# Patient Record
Sex: FEMALE | Race: WHITE | NOT HISPANIC OR LATINO | Employment: FULL TIME
[De-identification: names, ages, dates, MRNs, and addresses within clinical notes are randomized per-mention and may not be internally consistent; named-entity substitution may affect disease eponyms.]

---

## 2017-08-26 ENCOUNTER — HEALTH MAINTENANCE LETTER (OUTPATIENT)
Age: 22
End: 2017-08-26

## 2024-01-18 ENCOUNTER — HOSPITAL ENCOUNTER (EMERGENCY)
Facility: CLINIC | Age: 29
Discharge: HOME | End: 2024-01-18
Attending: EMERGENCY MEDICINE | Admitting: EMERGENCY MEDICINE

## 2024-01-18 ENCOUNTER — APPOINTMENT (OUTPATIENT)
Dept: CT IMAGING | Facility: CLINIC | Age: 29
End: 2024-01-18
Attending: EMERGENCY MEDICINE

## 2024-01-18 VITALS
WEIGHT: 115 LBS | TEMPERATURE: 98.2 F | BODY MASS INDEX: 19.63 KG/M2 | DIASTOLIC BLOOD PRESSURE: 70 MMHG | OXYGEN SATURATION: 96 % | HEIGHT: 64 IN | SYSTOLIC BLOOD PRESSURE: 118 MMHG | RESPIRATION RATE: 18 BRPM | HEART RATE: 92 BPM

## 2024-01-18 DIAGNOSIS — Z78.9 ALCOHOL USE: ICD-10-CM

## 2024-01-18 DIAGNOSIS — T25.229A DEEP PARTIAL THICKNESS BURN OF FOOT: ICD-10-CM

## 2024-01-18 DIAGNOSIS — R40.4 TRANSIENT ALTERATION OF AWARENESS: ICD-10-CM

## 2024-01-18 LAB
ALBUMIN SERPL BCG-MCNC: 4.4 G/DL (ref 3.5–5.2)
ALP SERPL-CCNC: 52 U/L (ref 40–150)
ALT SERPL W P-5'-P-CCNC: 14 U/L (ref 0–50)
AMPHETAMINES UR QL SCN: ABNORMAL
ANION GAP SERPL CALCULATED.3IONS-SCNC: 16 MMOL/L (ref 7–15)
AST SERPL W P-5'-P-CCNC: 64 U/L (ref 0–45)
ATRIAL RATE - MUSE: 83 BPM
BARBITURATES UR QL SCN: ABNORMAL
BASOPHILS # BLD AUTO: 0.1 10E3/UL (ref 0–0.2)
BASOPHILS NFR BLD AUTO: 1 %
BENZODIAZ UR QL SCN: ABNORMAL
BILIRUB SERPL-MCNC: 1.1 MG/DL
BUN SERPL-MCNC: 4.9 MG/DL (ref 6–20)
BZE UR QL SCN: ABNORMAL
CALCIUM SERPL-MCNC: 8.9 MG/DL (ref 8.6–10)
CANNABINOIDS UR QL SCN: ABNORMAL
CHLORIDE SERPL-SCNC: 94 MMOL/L (ref 98–107)
CREAT SERPL-MCNC: 0.65 MG/DL (ref 0.51–0.95)
DEPRECATED HCO3 PLAS-SCNC: 24 MMOL/L (ref 22–29)
DIASTOLIC BLOOD PRESSURE - MUSE: NORMAL MMHG
EGFRCR SERPLBLD CKD-EPI 2021: >90 ML/MIN/1.73M2
EOSINOPHIL # BLD AUTO: 0 10E3/UL (ref 0–0.7)
EOSINOPHIL NFR BLD AUTO: 0 %
ERYTHROCYTE [DISTWIDTH] IN BLOOD BY AUTOMATED COUNT: 14.7 % (ref 10–15)
ETHANOL SERPL-MCNC: <0.01 G/DL
FENTANYL UR QL: ABNORMAL
GLUCOSE SERPL-MCNC: 137 MG/DL (ref 70–99)
HCG SERPL QL: NEGATIVE
HCT VFR BLD AUTO: 32.9 % (ref 35–47)
HGB BLD-MCNC: 11.9 G/DL (ref 11.7–15.7)
IMM GRANULOCYTES # BLD: 0 10E3/UL
IMM GRANULOCYTES NFR BLD: 1 %
INTERPRETATION ECG - MUSE: NORMAL
LYMPHOCYTES # BLD AUTO: 1.1 10E3/UL (ref 0.8–5.3)
LYMPHOCYTES NFR BLD AUTO: 14 %
MCH RBC QN AUTO: 38 PG (ref 26.5–33)
MCHC RBC AUTO-ENTMCNC: 36.2 G/DL (ref 31.5–36.5)
MCV RBC AUTO: 105 FL (ref 78–100)
MONOCYTES # BLD AUTO: 0.9 10E3/UL (ref 0–1.3)
MONOCYTES NFR BLD AUTO: 12 %
NEUTROPHILS # BLD AUTO: 5.6 10E3/UL (ref 1.6–8.3)
NEUTROPHILS NFR BLD AUTO: 72 %
NRBC # BLD AUTO: 0 10E3/UL
NRBC BLD AUTO-RTO: 0 /100
OPIATES UR QL SCN: ABNORMAL
P AXIS - MUSE: 71 DEGREES
PCP QUAL URINE (ROCHE): ABNORMAL
PLATELET # BLD AUTO: 199 10E3/UL (ref 150–450)
POTASSIUM SERPL-SCNC: 3.4 MMOL/L (ref 3.4–5.3)
PR INTERVAL - MUSE: 130 MS
PROT SERPL-MCNC: 7.1 G/DL (ref 6.4–8.3)
QRS DURATION - MUSE: 86 MS
QT - MUSE: 386 MS
QTC - MUSE: 453 MS
R AXIS - MUSE: 71 DEGREES
RBC # BLD AUTO: 3.13 10E6/UL (ref 3.8–5.2)
SODIUM SERPL-SCNC: 134 MMOL/L (ref 135–145)
SYSTOLIC BLOOD PRESSURE - MUSE: NORMAL MMHG
T AXIS - MUSE: 73 DEGREES
VENTRICULAR RATE- MUSE: 83 BPM
WBC # BLD AUTO: 7.8 10E3/UL (ref 4–11)

## 2024-01-18 PROCEDURE — 80307 DRUG TEST PRSMV CHEM ANLYZR: CPT | Performed by: EMERGENCY MEDICINE

## 2024-01-18 PROCEDURE — 93005 ELECTROCARDIOGRAM TRACING: CPT | Mod: RTG

## 2024-01-18 PROCEDURE — 16020 DRESS/DEBRID P-THICK BURN S: CPT

## 2024-01-18 PROCEDURE — 80053 COMPREHEN METABOLIC PANEL: CPT | Performed by: EMERGENCY MEDICINE

## 2024-01-18 PROCEDURE — 85025 COMPLETE CBC W/AUTO DIFF WBC: CPT | Performed by: EMERGENCY MEDICINE

## 2024-01-18 PROCEDURE — 36415 COLL VENOUS BLD VENIPUNCTURE: CPT | Performed by: EMERGENCY MEDICINE

## 2024-01-18 PROCEDURE — 96374 THER/PROPH/DIAG INJ IV PUSH: CPT

## 2024-01-18 PROCEDURE — 99285 EMERGENCY DEPT VISIT HI MDM: CPT | Mod: 25

## 2024-01-18 PROCEDURE — 70450 CT HEAD/BRAIN W/O DYE: CPT

## 2024-01-18 PROCEDURE — 82077 ASSAY SPEC XCP UR&BREATH IA: CPT | Performed by: EMERGENCY MEDICINE

## 2024-01-18 PROCEDURE — 250N000011 HC RX IP 250 OP 636: Performed by: EMERGENCY MEDICINE

## 2024-01-18 PROCEDURE — 84703 CHORIONIC GONADOTROPIN ASSAY: CPT | Performed by: EMERGENCY MEDICINE

## 2024-01-18 RX ORDER — LORAZEPAM 2 MG/ML
1 INJECTION INTRAMUSCULAR ONCE
Status: COMPLETED | OUTPATIENT
Start: 2024-01-18 | End: 2024-01-18

## 2024-01-18 RX ADMIN — LORAZEPAM 1 MG: 2 INJECTION INTRAMUSCULAR; INTRAVENOUS at 04:38

## 2024-01-18 ASSESSMENT — ACTIVITIES OF DAILY LIVING (ADL)
ADLS_ACUITY_SCORE: 35

## 2024-01-18 NOTE — ED PROVIDER NOTES
"  History   Chief Complaint:  Transient alteration of awareness    HPI   Lilibeth Da Silva is a 28 year old female who presents to the ED for evaluation of a reported transient alteration of awareness at home including possible seizure-like activity.  This caused the patient to fall to the floor and her left foot to rest up against the baseboard heaters causing a burn and blisters to the lateral aspect of the left foot.  Patient states that her boyfriend was with her at the time and he called EMS.  Patient reports that she vapes nicotine and THC as well as was drinking alcohol today approximately half liter.  She reports that she has been drinking almost daily and has not gone without alcohol over the last 2 to 3 days.  Currently she feels fatigued and achy all over but denies any significant headache, neck pain, chest pain, abdominal pain, shortness of breath.  She denies any numbness tingling or weakness.  She denies any other drug use.  She denies any other symptoms at this time.  She reports that earlier in the day she felt that everything was normal and denies any recent illness or other symptoms.      Independent Historian:   None - Patient Only    Review of External Notes:  None    Medications:    Buprenorphine HCl-Naloxone   Fluoxetine    Past Medical History:    Anxiety  Depression  Substance abuse     Physical Exam   Patient Vitals for the past 24 hrs:   BP Temp Temp src Pulse Resp SpO2 Height Weight   01/18/24 0538 -- -- -- 93 -- -- -- --   01/18/24 0500 115/71 -- -- 86 15 -- -- --   01/18/24 0430 103/68 -- -- (!) 121 29 96 % -- --   01/18/24 0400 -- -- -- 100 17 -- -- --   01/18/24 0345 134/74 -- -- -- -- -- -- --   01/18/24 0245 -- -- -- 110 20 -- -- --   01/18/24 0211 136/81 98.3  F (36.8  C) Oral 105 24 -- 1.626 m (5' 4\") 52.2 kg (115 lb)   01/18/24 0200 (!) 119/105 -- -- (!) 168 (!) 37 97 % -- --        Physical Exam  General: Nontoxic Resting comfortably  Head:  Scalp, face, and head appear normal, " atraumatic non tender to palpation   Eyes:  Pupils are equal, round, reactive to light EOMI, no nystagmus     Conjunctivae non-injected and sclerae white  ENT:    The external nose is normal    Pinnae are normal  Neck:  Normal range of motion    There is no rigidity noted    Trachea is in the midline  CV:  Tachycardic rate, regular rhythm     Normal S1/S2, no S3/S4    No murmur or rub. Radial pulses 2+ bilaterally.  Resp:  Lungs are clear and equal bilaterally  There is no tachypnea    No increased work of breathing    No rales, wheezing, or rhonchi  GI:  Abdomen is soft, no rigidity or guarding    No distension, or mass    No tenderness or rebound tenderness   MS:  Normal muscular tone    Symmetric motor strength    No lower extremity edema  Skin:  No rash or acute skin lesions noted  Neuro:  A&Ox3, GCS 15    CN II - XII intact    Speech clear, fluent, and normal    Strength 5/5 and symmetric in bilateral upper and lower extremities.    No pronator drift. No leg drift. SILT throughout.    No ankle clonus    FTN testing normal. No tremor.     No meningismus   Psych: Anxious affect. Appropriate interactions.     Emergency Department Course     ECG results from 01/18/24   EKG 12-lead, tracing only     Value    Systolic Blood Pressure     Diastolic Blood Pressure     Ventricular Rate 83    Atrial Rate 83    DC Interval 130    QRS Duration 86        QTc 453    P Axis 71    R AXIS 71    T Axis 73    Interpretation ECG      Sinus rhythm  Normal ECG  No previous ECGs available  Interpreted by Sivakumar Manzanares MD at 2:44 AM.          Imaging:  CT Head w/o Contrast   Final Result   IMPRESSION:   1.  Normal head CT.           Laboratory:  Labs Ordered and Resulted from Time of ED Arrival to Time of ED Departure   COMPREHENSIVE METABOLIC PANEL - Abnormal       Result Value    Sodium 134 (*)     Potassium 3.4      Carbon Dioxide (CO2) 24      Anion Gap 16 (*)     Urea Nitrogen 4.9 (*)     Creatinine 0.65      GFR Estimate  >90      Calcium 8.9      Chloride 94 (*)     Glucose 137 (*)     Alkaline Phosphatase 52      AST 64 (*)     ALT 14      Protein Total 7.1      Albumin 4.4      Bilirubin Total 1.1     CBC WITH PLATELETS AND DIFFERENTIAL - Abnormal    WBC Count 7.8      RBC Count 3.13 (*)     Hemoglobin 11.9      Hematocrit 32.9 (*)      (*)     MCH 38.0 (*)     MCHC 36.2      RDW 14.7      Platelet Count 199      % Neutrophils 72      % Lymphocytes 14      % Monocytes 12      % Eosinophils 0      % Basophils 1      % Immature Granulocytes 1      NRBCs per 100 WBC 0      Absolute Neutrophils 5.6      Absolute Lymphocytes 1.1      Absolute Monocytes 0.9      Absolute Eosinophils 0.0      Absolute Basophils 0.1      Absolute Immature Granulocytes 0.0      Absolute NRBCs 0.0     URINE DRUG SCREEN PANEL - Abnormal    Amphetamines Urine Screen Negative      Barbituates Urine Screen Negative      Benzodiazepine Urine Screen Negative      Cannabinoids Urine Screen Positive (*)     Cocaine Urine Screen Negative      Fentanyl Qual Urine Screen Negative      Opiates Urine Screen Negative      PCP Urine Screen Negative     HCG QUALITATIVE PREGNANCY - Normal    hCG Serum Qualitative Negative     ETHYL ALCOHOL LEVEL - Normal    Alcohol ethyl <0.01          [facility]    Burn Treatment    Date/Time: 1/18/2024 5:30 AM    Performed by: Sivakumar Manzanares MD  Authorized by: Sivakumar Manzanares MD    Risks, benefits and alternatives discussed.      PROCEDURE DETAILS     Total body burn percentage - partial/full:  1    Burn area 1 details:     Burn depth:  Partial thickness (2nd)    Affected area: Lateral left foot.    Debridement performed: yes      Debridement mechanism:  Scissors    Indications for debridement: devitalized blisters and intact blisters      Wound base:  [place]    Wound treatment:  Bacitracin    Dressing:  Petrolatum gauze and non-stick sterile dressing        PROCEDURE    Patient Tolerance:  Patient  "tolerated the procedure well with no immediate complications       Emergency Department Course & Assessments:    Interventions:  Medications   LORazepam (ATIVAN) injection 1 mg (1 mg Intravenous $Given 1/18/24 0433)        Assessments, Independent Interpretation, Consults/Discussion of Management/Tests:  ED Course as of 01/18/24 0550   Thu Jan 18, 2024   0223 Patient seen and evaluated    0418 I performed an independent interpretation of the patient's head CT.  No large-volume intracranial hemorrhage or midline shift.   0538 Patient reassessed. Burn care performed.        Social Determinants of Health affecting care:  Social Connections/Isolation - patient reports her boyfriend broke up with her for another woman causing her increased stress and anxiety and increased alcohol use posing hazards to her health.    Disposition:  The patient was discharged to home.     Impression & Plan      Medical Decision Making:  Lilibeth Da Silva is a 28 year old female who presents to the ED for evaluation after a reported episode of transient alteration of awareness.  Patient reports that she was told she had a \"seizure\" but the details of the event are unclear.  She apparently fell to the floor and her foot came in contact with the baseboard heater which caused a burn to the lateral aspect of her left foot.  There are 2 areas of deep partial-thickness burns with blisters over the left lateral foot.  These were debrided and treated with bacitracin ointment and a sterile nonstick dressing.  I recommended she apply bacitracin twice daily keep the wounds clean and dry and covered with a sterile dressing.  In regards to the episode differential diagnosis includes syncope versus seizure versus drug effect.  ED evaluation is thankfully reassuring.  CT scan of the head negative for any acute intracranial findings.  She has no focal neurologic deficits and is completely back to her neurologic baseline.  She admits to drinking alcohol " today and using a THC vape but denies other drug use.  hCG is negative.  CBC and CMP are largely unremarkable.  Mild elevations of AST consistent with alcohol use.  Alcohol level here in the emergency department is negative.  She is not demonstrating any other evidence to suggest persistent alcohol withdrawal syndrome.  No significant hypertension or tachycardia once her initial anxiety of being in the emergency department resolved.  She is not tremulous and is not demonstrating evidence of hallucinations.  I discussed with the patient that the exact etiology of the event cannot be explained today but there does not appear to be any serious life-threatening or serious underlying emergent medical condition.  I discussed with her that it is certainly possible she had a seizure and very close follow-up with her outpatient primary care physician is indicated for further workup and monitoring.  I discussed with her that she should not drive or operate dangerous machinery until cleared by her doctor.  She had no recurrent episodes of altered mental status while in the emergency department.  EKG negative for ischemia or dysrhythmia.  Patient is agreeable to plan of care.  Close return precautions were provided and she was discharged in stable and improved condition.      Diagnosis:    ICD-10-CM    1. Transient alteration of awareness  R40.4       2. Alcohol use  Z78.9       3. Deep partial thickness burn of foot  T25.229A            Discharge Medications:  New Prescriptions    No medications on file          1/18/2024   Sivakumar Manzanares MD   Scribe Disclosure:  Roberth JALLOHed, am serving as a scribe at 2:02 AM on 1/18/2024 to document services personally performed by Sivakumar Manzanares MD based on my observations and the provider's statements to me.     Sivakumar Manzanares MD  01/18/24 0551

## 2024-01-18 NOTE — DISCHARGE INSTRUCTIONS
It is possible you may have had a seizure. You should avoid heavy alcohol use or use of any other drugs or illicit substances. Because you may have had a seizure you should not drive a vehicle or operate any dangerous machinery. [place] state law requires that you report a seizure, and the date it occurred, to the Department of Vehicle Services within 30 days of the episode.

## 2024-01-18 NOTE — ED TRIAGE NOTES
BIBA for seizure like activity; pt's boyfriend reports hearing a noise upstairs, found pt on floor with seizure like activity. Reports blue lips, fingers. Activity lasting approx 45 sec -1 min. EMS, pt report pt's L foot up against radiator during seizure, dressed on scene with gauze.

## 2024-01-18 NOTE — ED NOTES
"Writer attempted to get information from patient as she stated she needed help arranging discharge transportation. When asked what address to use, pt states \"I don't have an apartment anymore. My boyfriend says he's kicking me out and throwing my stuff out. He doesn't want me going back to get my stuff and he'll try to get me arrested if I go back.\" Plan to allow patient more time to arrange a discharge address, pt agreeable. Continue to monitor, readdress.   "

## 2024-01-18 NOTE — ED NOTES
Bed: ED24  Expected date: 1/18/24  Expected time: 1:50 AM  Means of arrival: Ambulance  Comments:  AMAURI 427 27F seizure

## 2024-01-18 NOTE — ED NOTES
"Pt returned from bathroom with urine specimen. Was heard by staff in rose shouting \"get out!\" When staff entered room, pt was alone. Pt states that ex-boyfriend was just there (no observed visitors), then states she was alone. States she has \"voices in her head,\" that no body cares about her. Dr. Manzanares made aware of pt's fluctuating behaviors. One time dose ativan IV ordered. Pt agreeable and more oriented/calm during administration. Continue to monitor.   "

## 2024-01-18 NOTE — ED NOTES
Pt asked if she felt able to produce a urine sample, states she feels able. Specimen container provided, pt ambulated down rose towards bathroom. Collection instructions provided. Awaiting outcome, continue to monitor.

## 2024-01-19 ENCOUNTER — HOSPITAL ENCOUNTER (INPATIENT)
Facility: CLINIC | Age: 29
LOS: 3 days | Discharge: HOME | DRG: 897 | End: 2024-01-22
Attending: EMERGENCY MEDICINE | Admitting: STUDENT IN AN ORGANIZED HEALTH CARE EDUCATION/TRAINING PROGRAM
Payer: MEDICAID

## 2024-01-19 DIAGNOSIS — F10.229 ACUTE ALCOHOLIC INTOXICATION IN ALCOHOLISM WITH COMPLICATION (H): ICD-10-CM

## 2024-01-19 DIAGNOSIS — S90.852A: Primary | ICD-10-CM

## 2024-01-19 DIAGNOSIS — S91.302A UNSPECIFIED OPEN WOUND, LEFT FOOT, INITIAL ENCOUNTER: ICD-10-CM

## 2024-01-19 DIAGNOSIS — F19.90 SUBSTANCE USE DISORDER: ICD-10-CM

## 2024-01-19 DIAGNOSIS — F10.951: ICD-10-CM

## 2024-01-19 PROBLEM — F10.99 ALCOHOL-RELATED DISORDER (H): Status: ACTIVE | Noted: 2024-01-19

## 2024-01-19 LAB
ALBUMIN SERPL BCG-MCNC: 4.7 G/DL (ref 3.5–5.2)
ALP SERPL-CCNC: 52 U/L (ref 40–150)
ALT SERPL W P-5'-P-CCNC: 17 U/L (ref 0–50)
AMPHETAMINES UR QL SCN: ABNORMAL
ANION GAP SERPL CALCULATED.3IONS-SCNC: 15 MMOL/L (ref 7–15)
AST SERPL W P-5'-P-CCNC: 80 U/L (ref 0–45)
BARBITURATES UR QL SCN: ABNORMAL
BASOPHILS # BLD AUTO: 0.1 10E3/UL (ref 0–0.2)
BASOPHILS NFR BLD AUTO: 1 %
BENZODIAZ UR QL SCN: ABNORMAL
BILIRUB SERPL-MCNC: 0.7 MG/DL
BUN SERPL-MCNC: 7.1 MG/DL (ref 6–20)
BZE UR QL SCN: ABNORMAL
CALCIUM SERPL-MCNC: 9.7 MG/DL (ref 8.6–10)
CANNABINOIDS UR QL SCN: ABNORMAL
CHLORIDE SERPL-SCNC: 97 MMOL/L (ref 98–107)
CREAT SERPL-MCNC: 0.63 MG/DL (ref 0.51–0.95)
DEPRECATED HCO3 PLAS-SCNC: 26 MMOL/L (ref 22–29)
EGFRCR SERPLBLD CKD-EPI 2021: >90 ML/MIN/1.73M2
EOSINOPHIL # BLD AUTO: 0 10E3/UL (ref 0–0.7)
EOSINOPHIL NFR BLD AUTO: 0 %
ERYTHROCYTE [DISTWIDTH] IN BLOOD BY AUTOMATED COUNT: 14.9 % (ref 10–15)
ETHANOL SERPL-MCNC: 0.12 G/DL
FENTANYL UR QL: ABNORMAL
GLUCOSE SERPL-MCNC: 95 MG/DL (ref 70–99)
HCG SERPL QL: NEGATIVE
HCT VFR BLD AUTO: 36.1 % (ref 35–47)
HGB BLD-MCNC: 13.1 G/DL (ref 11.7–15.7)
IMM GRANULOCYTES # BLD: 0 10E3/UL
IMM GRANULOCYTES NFR BLD: 1 %
LYMPHOCYTES # BLD AUTO: 1 10E3/UL (ref 0.8–5.3)
LYMPHOCYTES NFR BLD AUTO: 13 %
MAGNESIUM SERPL-MCNC: 1.8 MG/DL (ref 1.7–2.3)
MCH RBC QN AUTO: 38.9 PG (ref 26.5–33)
MCHC RBC AUTO-ENTMCNC: 36.3 G/DL (ref 31.5–36.5)
MCV RBC AUTO: 107 FL (ref 78–100)
MONOCYTES # BLD AUTO: 1 10E3/UL (ref 0–1.3)
MONOCYTES NFR BLD AUTO: 12 %
NEUTROPHILS # BLD AUTO: 6 10E3/UL (ref 1.6–8.3)
NEUTROPHILS NFR BLD AUTO: 73 %
NRBC # BLD AUTO: 0 10E3/UL
NRBC BLD AUTO-RTO: 0 /100
OPIATES UR QL SCN: ABNORMAL
PCP QUAL URINE (ROCHE): ABNORMAL
PHOSPHATE SERPL-MCNC: 2.8 MG/DL (ref 2.5–4.5)
PLATELET # BLD AUTO: 243 10E3/UL (ref 150–450)
POTASSIUM SERPL-SCNC: 2.9 MMOL/L (ref 3.4–5.3)
PROT SERPL-MCNC: 7.8 G/DL (ref 6.4–8.3)
RBC # BLD AUTO: 3.37 10E6/UL (ref 3.8–5.2)
SODIUM SERPL-SCNC: 138 MMOL/L (ref 135–145)
VIT B12 SERPL-MCNC: 888 PG/ML (ref 232–1245)
WBC # BLD AUTO: 8.2 10E3/UL (ref 4–11)

## 2024-01-19 PROCEDURE — 250N000013 HC RX MED GY IP 250 OP 250 PS 637: Performed by: STUDENT IN AN ORGANIZED HEALTH CARE EDUCATION/TRAINING PROGRAM

## 2024-01-19 PROCEDURE — 80307 DRUG TEST PRSMV CHEM ANLYZR: CPT | Performed by: EMERGENCY MEDICINE

## 2024-01-19 PROCEDURE — 250N000011 HC RX IP 250 OP 636: Performed by: EMERGENCY MEDICINE

## 2024-01-19 PROCEDURE — 250N000013 HC RX MED GY IP 250 OP 250 PS 637: Performed by: EMERGENCY MEDICINE

## 2024-01-19 PROCEDURE — 99232 SBSQ HOSP IP/OBS MODERATE 35: CPT | Performed by: PSYCHIATRY & NEUROLOGY

## 2024-01-19 PROCEDURE — 82374 ASSAY BLOOD CARBON DIOXIDE: CPT | Performed by: EMERGENCY MEDICINE

## 2024-01-19 PROCEDURE — 85025 COMPLETE CBC W/AUTO DIFF WBC: CPT | Performed by: EMERGENCY MEDICINE

## 2024-01-19 PROCEDURE — HZ2ZZZZ DETOXIFICATION SERVICES FOR SUBSTANCE ABUSE TREATMENT: ICD-10-PCS | Performed by: STUDENT IN AN ORGANIZED HEALTH CARE EDUCATION/TRAINING PROGRAM

## 2024-01-19 PROCEDURE — 84100 ASSAY OF PHOSPHORUS: CPT | Performed by: EMERGENCY MEDICINE

## 2024-01-19 PROCEDURE — 99223 1ST HOSP IP/OBS HIGH 75: CPT | Performed by: STUDENT IN AN ORGANIZED HEALTH CARE EDUCATION/TRAINING PROGRAM

## 2024-01-19 PROCEDURE — 96375 TX/PRO/DX INJ NEW DRUG ADDON: CPT | Performed by: EMERGENCY MEDICINE

## 2024-01-19 PROCEDURE — 82077 ASSAY SPEC XCP UR&BREATH IA: CPT | Performed by: EMERGENCY MEDICINE

## 2024-01-19 PROCEDURE — 99285 EMERGENCY DEPT VISIT HI MDM: CPT | Mod: 25 | Performed by: EMERGENCY MEDICINE

## 2024-01-19 PROCEDURE — 120N000002 HC R&B MED SURG/OB UMMC

## 2024-01-19 PROCEDURE — 83735 ASSAY OF MAGNESIUM: CPT | Performed by: EMERGENCY MEDICINE

## 2024-01-19 PROCEDURE — 36415 COLL VENOUS BLD VENIPUNCTURE: CPT | Performed by: EMERGENCY MEDICINE

## 2024-01-19 PROCEDURE — 258N000003 HC RX IP 258 OP 636: Performed by: EMERGENCY MEDICINE

## 2024-01-19 PROCEDURE — 250N000009 HC RX 250: Performed by: EMERGENCY MEDICINE

## 2024-01-19 PROCEDURE — 84703 CHORIONIC GONADOTROPIN ASSAY: CPT | Performed by: EMERGENCY MEDICINE

## 2024-01-19 PROCEDURE — 96365 THER/PROPH/DIAG IV INF INIT: CPT | Performed by: EMERGENCY MEDICINE

## 2024-01-19 PROCEDURE — 82607 VITAMIN B-12: CPT | Performed by: STUDENT IN AN ORGANIZED HEALTH CARE EDUCATION/TRAINING PROGRAM

## 2024-01-19 PROCEDURE — 82247 BILIRUBIN TOTAL: CPT | Performed by: EMERGENCY MEDICINE

## 2024-01-19 PROCEDURE — 99285 EMERGENCY DEPT VISIT HI MDM: CPT | Performed by: EMERGENCY MEDICINE

## 2024-01-19 PROCEDURE — 96361 HYDRATE IV INFUSION ADD-ON: CPT | Performed by: EMERGENCY MEDICINE

## 2024-01-19 RX ORDER — DIAZEPAM 10 MG/2ML
5-10 INJECTION, SOLUTION INTRAMUSCULAR; INTRAVENOUS EVERY 30 MIN PRN
Status: DISCONTINUED | OUTPATIENT
Start: 2024-01-19 | End: 2024-01-19

## 2024-01-19 RX ORDER — LORAZEPAM 2 MG/ML
1 INJECTION INTRAMUSCULAR ONCE
Status: COMPLETED | OUTPATIENT
Start: 2024-01-19 | End: 2024-01-19

## 2024-01-19 RX ORDER — OLANZAPINE 5 MG/1
5-10 TABLET, ORALLY DISINTEGRATING ORAL EVERY 6 HOURS PRN
Status: DISCONTINUED | OUTPATIENT
Start: 2024-01-19 | End: 2024-01-19

## 2024-01-19 RX ORDER — DIAZEPAM 5 MG
10 TABLET ORAL EVERY 30 MIN PRN
Status: DISCONTINUED | OUTPATIENT
Start: 2024-01-19 | End: 2024-01-22

## 2024-01-19 RX ORDER — GABAPENTIN 300 MG/1
300 CAPSULE ORAL EVERY 8 HOURS
Qty: 6 CAPSULE | Refills: 0 | Status: DISCONTINUED | OUTPATIENT
Start: 2024-01-25 | End: 2024-01-21

## 2024-01-19 RX ORDER — FOLIC ACID 1 MG/1
1 TABLET ORAL DAILY
Status: DISCONTINUED | OUTPATIENT
Start: 2024-01-20 | End: 2024-01-19

## 2024-01-19 RX ORDER — GABAPENTIN 600 MG/1
1200 TABLET ORAL ONCE
Status: COMPLETED | OUTPATIENT
Start: 2024-01-19 | End: 2024-01-19

## 2024-01-19 RX ORDER — AMOXICILLIN 250 MG
1 CAPSULE ORAL 2 TIMES DAILY PRN
Status: DISCONTINUED | OUTPATIENT
Start: 2024-01-19 | End: 2024-01-22 | Stop reason: HOSPADM

## 2024-01-19 RX ORDER — OLANZAPINE 5 MG/1
5 TABLET, ORALLY DISINTEGRATING ORAL ONCE
Status: COMPLETED | OUTPATIENT
Start: 2024-01-19 | End: 2024-01-19

## 2024-01-19 RX ORDER — HALOPERIDOL 5 MG/ML
2.5-5 INJECTION INTRAMUSCULAR EVERY 6 HOURS PRN
Status: DISCONTINUED | OUTPATIENT
Start: 2024-01-19 | End: 2024-01-19

## 2024-01-19 RX ORDER — MULTIPLE VITAMINS W/ MINERALS TAB 9MG-400MCG
1 TAB ORAL DAILY
Status: DISCONTINUED | OUTPATIENT
Start: 2024-01-20 | End: 2024-01-22 | Stop reason: HOSPADM

## 2024-01-19 RX ORDER — CLONIDINE HYDROCHLORIDE 0.1 MG/1
0.1 TABLET ORAL EVERY 8 HOURS
Status: DISCONTINUED | OUTPATIENT
Start: 2024-01-19 | End: 2024-01-21

## 2024-01-19 RX ORDER — OLANZAPINE 5 MG/1
5-10 TABLET, ORALLY DISINTEGRATING ORAL EVERY 6 HOURS PRN
Status: DISCONTINUED | OUTPATIENT
Start: 2024-01-19 | End: 2024-01-21

## 2024-01-19 RX ORDER — FLUMAZENIL 0.1 MG/ML
0.2 INJECTION, SOLUTION INTRAVENOUS
Status: DISCONTINUED | OUTPATIENT
Start: 2024-01-19 | End: 2024-01-22 | Stop reason: HOSPADM

## 2024-01-19 RX ORDER — LIDOCAINE 40 MG/G
CREAM TOPICAL
Status: DISCONTINUED | OUTPATIENT
Start: 2024-01-19 | End: 2024-01-22 | Stop reason: HOSPADM

## 2024-01-19 RX ORDER — POTASSIUM CHLORIDE 750 MG/1
40 TABLET, EXTENDED RELEASE ORAL
Status: COMPLETED | OUTPATIENT
Start: 2024-01-19 | End: 2024-01-19

## 2024-01-19 RX ORDER — DIAZEPAM 10 MG/2ML
5-10 INJECTION, SOLUTION INTRAMUSCULAR; INTRAVENOUS EVERY 30 MIN PRN
Status: DISCONTINUED | OUTPATIENT
Start: 2024-01-19 | End: 2024-01-22

## 2024-01-19 RX ORDER — QUETIAPINE FUMARATE 50 MG/1
50 TABLET, FILM COATED ORAL 2 TIMES DAILY
Status: DISCONTINUED | OUTPATIENT
Start: 2024-01-19 | End: 2024-01-21

## 2024-01-19 RX ORDER — MULTIPLE VITAMINS W/ MINERALS TAB 9MG-400MCG
1 TAB ORAL DAILY
Status: DISCONTINUED | OUTPATIENT
Start: 2024-01-20 | End: 2024-01-19

## 2024-01-19 RX ORDER — AMOXICILLIN 250 MG
2 CAPSULE ORAL 2 TIMES DAILY PRN
Status: DISCONTINUED | OUTPATIENT
Start: 2024-01-19 | End: 2024-01-22 | Stop reason: HOSPADM

## 2024-01-19 RX ORDER — BUPRENORPHINE AND NALOXONE 4; 1 MG/1; MG/1
1 FILM, SOLUBLE BUCCAL; SUBLINGUAL 3 TIMES DAILY
Status: DISCONTINUED | OUTPATIENT
Start: 2024-01-19 | End: 2024-01-21

## 2024-01-19 RX ORDER — GABAPENTIN 300 MG/1
900 CAPSULE ORAL EVERY 8 HOURS
Qty: 27 CAPSULE | Refills: 0 | Status: DISCONTINUED | OUTPATIENT
Start: 2024-01-20 | End: 2024-01-21

## 2024-01-19 RX ORDER — CETIRIZINE HYDROCHLORIDE 10 MG/1
10 TABLET ORAL ONCE
Status: COMPLETED | OUTPATIENT
Start: 2024-01-19 | End: 2024-01-19

## 2024-01-19 RX ORDER — FOLIC ACID 1 MG/1
1 TABLET ORAL DAILY
Status: DISCONTINUED | OUTPATIENT
Start: 2024-01-20 | End: 2024-01-22 | Stop reason: HOSPADM

## 2024-01-19 RX ORDER — CALCIUM CARBONATE 500 MG/1
1000 TABLET, CHEWABLE ORAL 4 TIMES DAILY PRN
Status: DISCONTINUED | OUTPATIENT
Start: 2024-01-19 | End: 2024-01-22 | Stop reason: HOSPADM

## 2024-01-19 RX ORDER — FLUMAZENIL 0.1 MG/ML
0.2 INJECTION, SOLUTION INTRAVENOUS
Status: DISCONTINUED | OUTPATIENT
Start: 2024-01-19 | End: 2024-01-19

## 2024-01-19 RX ORDER — SODIUM CHLORIDE 9 MG/ML
INJECTION, SOLUTION INTRAVENOUS CONTINUOUS
Status: DISCONTINUED | OUTPATIENT
Start: 2024-01-19 | End: 2024-01-21

## 2024-01-19 RX ORDER — HALOPERIDOL 5 MG/ML
2.5-5 INJECTION INTRAMUSCULAR EVERY 6 HOURS PRN
Status: DISCONTINUED | OUTPATIENT
Start: 2024-01-19 | End: 2024-01-21

## 2024-01-19 RX ORDER — GABAPENTIN 300 MG/1
600 CAPSULE ORAL EVERY 8 HOURS
Qty: 12 CAPSULE | Refills: 0 | Status: DISCONTINUED | OUTPATIENT
Start: 2024-01-23 | End: 2024-01-21

## 2024-01-19 RX ORDER — DIAZEPAM 5 MG
10 TABLET ORAL EVERY 30 MIN PRN
Status: DISCONTINUED | OUTPATIENT
Start: 2024-01-19 | End: 2024-01-19

## 2024-01-19 RX ORDER — GABAPENTIN 100 MG/1
100 CAPSULE ORAL EVERY 8 HOURS
Qty: 9 CAPSULE | Refills: 0 | Status: DISCONTINUED | OUTPATIENT
Start: 2024-01-27 | End: 2024-01-21

## 2024-01-19 RX ADMIN — POTASSIUM CHLORIDE 40 MEQ: 750 TABLET, EXTENDED RELEASE ORAL at 11:38

## 2024-01-19 RX ADMIN — CLONIDINE HYDROCHLORIDE 0.1 MG: 0.1 TABLET ORAL at 23:57

## 2024-01-19 RX ADMIN — CETIRIZINE HYDROCHLORIDE 10 MG: 10 TABLET, FILM COATED ORAL at 13:47

## 2024-01-19 RX ADMIN — SODIUM CHLORIDE 500 ML: 9 INJECTION, SOLUTION INTRAVENOUS at 08:39

## 2024-01-19 RX ADMIN — QUETIAPINE FUMARATE 50 MG: 50 TABLET ORAL at 23:57

## 2024-01-19 RX ADMIN — FOLIC ACID: 5 INJECTION, SOLUTION INTRAMUSCULAR; INTRAVENOUS; SUBCUTANEOUS at 10:03

## 2024-01-19 RX ADMIN — OLANZAPINE 5 MG: 5 TABLET, ORALLY DISINTEGRATING ORAL at 09:27

## 2024-01-19 RX ADMIN — SODIUM CHLORIDE: 0.9 INJECTION, SOLUTION INTRAVENOUS at 08:39

## 2024-01-19 RX ADMIN — GABAPENTIN 1200 MG: 600 TABLET, FILM COATED ORAL at 23:56

## 2024-01-19 RX ADMIN — LORAZEPAM 1 MG: 2 INJECTION, SOLUTION INTRAMUSCULAR; INTRAVENOUS at 09:36

## 2024-01-19 RX ADMIN — DIAZEPAM 10 MG: 5 TABLET ORAL at 16:52

## 2024-01-19 RX ADMIN — QUETIAPINE FUMARATE 50 MG: 50 TABLET ORAL at 13:47

## 2024-01-19 RX ADMIN — DIAZEPAM 10 MG: 5 TABLET ORAL at 23:57

## 2024-01-19 RX ADMIN — POTASSIUM CHLORIDE 40 MEQ: 750 TABLET, EXTENDED RELEASE ORAL at 09:36

## 2024-01-19 ASSESSMENT — ACTIVITIES OF DAILY LIVING (ADL)
ADLS_ACUITY_SCORE: 35

## 2024-01-19 ASSESSMENT — LIFESTYLE VARIABLES
AGITATION: NORMAL ACTIVITY
NAUSEA AND VOMITING: NO NAUSEA AND NO VOMITING
TOTAL SCORE: 7
PAROXYSMAL SWEATS: NO SWEAT VISIBLE
TACTILE DISTURBANCES: MILD ITCHING, PINS AND NEEDLES, BURNING OR NUMBNESS
ORIENTATION AND CLOUDING OF SENSORIUM: CANNOT DO SERIAL ADDITIONS OR IS UNCERTAIN ABOUT DATE
VISUAL DISTURBANCES: MILD SENSITIVITY
AUDITORY DISTURBANCES: VERY MILD HARSHNESS OR ABILITY TO FRIGHTEN
TREMOR: NO TREMOR
HEADACHE, FULLNESS IN HEAD: NOT PRESENT
ANXIETY: MILDLY ANXIOUS

## 2024-01-19 NOTE — H&P
[facility]    History and Physical - Hospitalist Service, GOLD TEAM 7       Date of Admission:  1/19/2024    Assessment & Plan      Lilibeth Da Silva is a 28 year old female with a history of substance use disorder on suboxone, anxiety, depression, and alcohol use disorder, who is presenting today in acute psychosis.     # Acute Psychosis   # Alcohol Use Disorder, Concern for withdrawal and intoxication  Alcohol level elevated at 0.12 with recent history of seizure activity prior to arrival. Per chart review, has been drinking heavily. Has been having visual and auditory hallucinations. Agitated, expansive mood.   - Psychiatry consult, appreciate recs  - CBC, CMP, Tox screen ordered  - Replete K+  - CIWA protocol  - TSH, vitamin B12, folate levels ordered  - Gabapentin and clonidine ordered  - Folic acid and thiamine ordered  - Chem dep consult if patient amenable     #Hypokalemia: Likely in the setting of alcohol use and poor nutrition. Replete per protocol.   #Elevated AST: Could be elevated in the setting of injury to liver, cardiac muscle, skeletal muscle, kidney, and/or brain. Will continue to trend for now, as no other s/s of end organ damage.  #Macrocytic Anemia: Likely in the setting of alcohol use with associated folic acid and/or vitamin B12 deficiency. Folic acid and thiamine tests ordered   #SHIRLEY: Continue suboxone TID  #Mood Disorder: Previously on Fluoxetine 20 mg, but stopped taking. Will not resume in the inpatient setting for now.         Diet: Regular Diet Adult    DVT Prophylaxis: Low Risk/Ambulatory with no VTE prophylaxis indicated  Thapa Catheter: Not present  Lines: None     Cardiac Monitoring: None  Code Status:  FULL    Clinically Significant Risk Factors Present on Admission        # Hypokalemia: Lowest K = 2.9 mmol/L in last 2 days, will replace as needed                           Disposition Plan      Expected Discharge Date: 01/21/2024                   Disposition: Plan to transfer to the [facility].     Gwen Gonzales MD  Hospitalist Service, GOLD TEAM 7  [facility]  Securely message with eMeter (more info)  Text page via [employer] Paging/Directory   See signed in provider for up to date coverage information    ______________________________________________________________________    Chief Complaint   History obtained from the patient.     History of Present Illness   Lilibeth Da Silva is a 28 year old female with a history of substance use disorder on suboxone, anxiety, depression, and alcohol use disorder, who is presenting today with auditory and visual hallucinations. Unfortunately, history was unable to be obtained from the patient, as she was unable to participate in history and physical examination. Per chart review, patient was seen on 1/18 after having seizure-like activity and burning her foot on a baseboard. She was discharged and then seen again today for visual/auditory hallucinations.    Past Medical History    Past Medical History:   Diagnosis Date    Anxiety     Depression     Substance abuse (H)     Substance abuse (H)        Past Surgical History  - None per chart review    Prior to Admission Medications   Prior to Admission Medications   Prescriptions Last Dose Informant Patient Reported? Taking?   Buprenorphine HCl-Naloxone HCl (SUBOXONE) 4-1 MG film   No No   Sig: Place 1 Film under the tongue 3 times daily   Patient not taking: Reported on 1/18/2024   FLUoxetine (PROZAC) 10 MG capsule  Self No No   Sig: Take 3 caps in the morning   Patient taking differently: 20 mg Take 3 caps in the morning   FLUoxetine (PROZAC) 20 MG capsule   No No   Sig: Take 1 capsule (20 mg) by mouth daily      Facility-Administered Medications: None        Physical Exam   Vital Signs: Temp: 97.6  F (36.4  C) Temp src: Oral BP: 129/71 Pulse: 85   Resp: 18 SpO2: 94 % O2 Device: None (Room air)    Weight: 0 lbs 0  oz  General: WDWN F, agitated and restless, walking around room  HEENT: anicteric sclera, pinpoint pupils, EOMI  Heart: RRR, nl S1 and S2, no m/r/g  Lungs: CTAB, no r/w/r  Abdomen: NTND    Medical Decision Making         Data   Results for orders placed or performed during the hospital encounter of 01/19/24 (from the past 24 hour(s))   CBC with platelets differential    Narrative    The following orders were created for panel order CBC with platelets differential.  Procedure                               Abnormality         Status                     ---------                               -----------         ------                     CBC with platelets and d...[[id number]]  Abnormal            Final result                 Please view results for these tests on the individual orders.   Comprehensive metabolic panel   Result Value Ref Range    Sodium 138 135 - 145 mmol/L    Potassium 2.9 (L) 3.4 - 5.3 mmol/L    Carbon Dioxide (CO2) 26 22 - 29 mmol/L    Anion Gap 15 7 - 15 mmol/L    Urea Nitrogen 7.1 6.0 - 20.0 mg/dL    Creatinine 0.63 0.51 - 0.95 mg/dL    GFR Estimate >90 >60 mL/min/1.73m2    Calcium 9.7 8.6 - 10.0 mg/dL    Chloride 97 (L) 98 - 107 mmol/L    Glucose 95 70 - 99 mg/dL    Alkaline Phosphatase 52 40 - 150 U/L    AST 80 (H) 0 - 45 U/L    ALT 17 0 - 50 U/L    Protein Total 7.8 6.4 - 8.3 g/dL    Albumin 4.7 3.5 - 5.2 g/dL    Bilirubin Total 0.7 <=1.2 mg/dL   Magnesium   Result Value Ref Range    Magnesium 1.8 1.7 - 2.3 mg/dL   HCG qualitative Blood   Result Value Ref Range    hCG Serum Qualitative Negative Negative   Alcohol ethyl   Result Value Ref Range    Alcohol ethyl 0.12 (H) <=0.01 g/dL   CBC with platelets and differential   Result Value Ref Range    WBC Count 8.2 4.0 - 11.0 10e3/uL    RBC Count 3.37 (L) 3.80 - 5.20 10e6/uL    Hemoglobin 13.1 11.7 - 15.7 g/dL    Hematocrit 36.1 35.0 - 47.0 %     (H) 78 - 100 fL    MCH 38.9 (H) 26.5 - 33.0 pg    MCHC 36.3 31.5 - 36.5 g/dL    RDW 14.9 10.0 -  15.0 %    Platelet Count 243 150 - 450 10e3/uL    % Neutrophils 73 %    % Lymphocytes 13 %    % Monocytes 12 %    % Eosinophils 0 %    % Basophils 1 %    % Immature Granulocytes 1 %    NRBCs per 100 WBC 0 <1 /100    Absolute Neutrophils 6.0 1.6 - 8.3 10e3/uL    Absolute Lymphocytes 1.0 0.8 - 5.3 10e3/uL    Absolute Monocytes 1.0 0.0 - 1.3 10e3/uL    Absolute Eosinophils 0.0 0.0 - 0.7 10e3/uL    Absolute Basophils 0.1 0.0 - 0.2 10e3/uL    Absolute Immature Granulocytes 0.0 <=0.4 10e3/uL    Absolute NRBCs 0.0 10e3/uL   Phosphorus   Result Value Ref Range    Phosphorus 2.8 2.5 - 4.5 mg/dL   Urine Drug Screen    Narrative    The following orders were created for panel order Urine Drug Screen.  Procedure                               Abnormality         Status                     ---------                               -----------         ------                     Urine Drug Screen Panel[[id number]]      Abnormal            Final result                 Please view results for these tests on the individual orders.   Urine Drug Screen Panel   Result Value Ref Range    Amphetamines Urine Screen Negative Screen Negative    Barbituates Urine Screen Negative Screen Negative    Benzodiazepine Urine Screen Negative Screen Negative    Cannabinoids Urine Screen Positive (A) Screen Negative    Cocaine Urine Screen Negative Screen Negative    Fentanyl Qual Urine Screen Negative Screen Negative    Opiates Urine Screen Negative Screen Negative    PCP Urine Screen Negative Screen Negative

## 2024-01-19 NOTE — MEDICATION SCRIBE - ADMISSION MEDICATION HISTORY
Medication Scribe Admission Medication History    Admission medication history is complete. The information provided in this note is only as accurate as the sources available at the time of the update.    Information Source(s): Facility (U/NH/) medication list/MAR and CareEverywhere/SureScripts via in-person    Pertinent Information: Patient seen yesterday at [facility] ED 1/18/2024. Writer completed medication hx per discharge summary from this visit. Writer deleted Suboxone 4-1 MG film and Fluoxetine 10 MG Cap.    Changes made to PTA medication list:  Added: None  Deleted: Suboxone 4-1 MG film            Fluoxetine 10 MG Cap  Changed: None       Allergies reviewed with patient and updates made in EHR: no    Medication History Completed By: Nichole Faulkner 1/19/2024 12:42 PM    PTA Med List   Medication Sig Last Dose    FLUoxetine (PROZAC) 20 MG capsule Take 1 capsule (20 mg) by mouth daily Unknown

## 2024-01-19 NOTE — CONSULTS
"      Psychiatry Consultation; Follow up         Labs and imaging reviewed. K 2.9    Total time spent in chart review, patient interview and coordination of care; 35      Identification Ms. Suzie Da Silva is a 28-year-old white female who presents to our emergency department with hallucinations and disorganization.  She has a previous history of substance use which was treated with buprenorphine and it appears that her narcotic use is in remission however she says she has been drinking every day for 3 days and this morning had about 1/2 L of hard liquor she was also vague being nicotine and THC.  I am asked to evaluate her psychiatric status by Dr. Gonzales.       Interim history:    Prior to interviewing this patient I had an opportunity to review the electronic medical record including the initial diagnostic evaluation completed by Shawna SABA she notes that the patient was experiencing auditory and visual hallucinations with hyperactive restless unable to focus and responding inappropriately.  The initial ED note completed by Dr. Manzanares documents her recent substance abuse.    On my interview the patient initially assured me that she had not built the emergency room, she had very labile affect and mood, and answering orientation questions she reported that she was in a hotel and was never able to give me the name of the month.  She did have a waxing and waning mental status usually laughing and smiling but at times appearing to be asleep..  Most of her symptoms are consistent with delirium although she is somewhat grandiose and \"manic delirium\" cannot be ruled out, I did interview the patient on 2 occasions and on the second occasion she believed that her father was in the room and asked me to get out of his way she also was angry that everyone was talking at once but I was the only one asking questions.  Given her recent history of substance use it seems likely that her presentation is a combination of " "intoxication and withdrawal.  I recommend that she be admitted to medicine started on a withdrawal protocol and given antipsychotics as necessary.  I note that she has recently been started on a Valium protocol and olanzapine and low-dose Haldol are available as PRNs.    I had an opportunity to discuss this case with the treating physician as well as the nursing staff the patient will be admitted to medicine and if she does not clear please consider a reconsult by psychiatry to report determine whether there is some other underlying psychotic illness     buprenorphine HCl-naloxone HCl  1 Film Sublingual TID    FLUoxetine  20 mg Oral Daily    [START ON 1/20/2024] folic acid  1 mg Oral Daily    [START ON 1/20/2024] multivitamin w/minerals  1 tablet Oral Daily    QUEtiapine  50 mg Oral BID    sodium chloride (PF)  3 mL Intracatheter Q8H    [START ON 1/20/2024] thiamine  100 mg Oral Daily              MSE:     On my interview the patient was laying in a gurney and appearing restless. Patient was intermittently cooperative, mood and, affect were labile, speech was coherent but disorganized Associations loose. Thought process was disorganized. Content of thought includes hallucinations and delusions she did not report suicidal ideation. Patient alert and oriented x 1.  Recent and remote memory, concentration, fund of knowledge appeared impaired, and use of language were generally normal. Insight and judgement impaired.     Vital signs:  Temp: 98.2  F (36.8  C) Temp src: Oral BP: 126/84 Pulse: 81   Resp: 25 SpO2: 98 % O2 Device: None (Room air)        Estimated body mass index is 19.74 kg/m  as calculated from the following:    Height as of 1/18/24: 1.626 m (5' 4\").    Weight as of 1/18/24: 52.2 kg (115 lb).    Qtc: 453            Assessment:   Delirium likely secondary to alcohol withdrawal and perhaps other substances rule out underlying psychiatric disorder if her mental status does not clear          Plan:   Admit to " "internal medicine, treat with withdrawal protocol have as needed antipsychotics available as necessary, please check a fentanyl level.  I note that her potassium is already being appropriately replaced          Yan Guzman MD  \"Much or all of the text in this note was generated through the use of Dragon Dictate voice to text software. Errors in spelling or words which appear to be out of contact are unintentional, may be present due having escaped editing\"          "

## 2024-01-19 NOTE — ED TRIAGE NOTES
Pt BIBA from home  Yesterday was seen in ED for seizure - discharge 7pm  Auditory and visual hallucinations since 7pm last night  Denies self harm/SI  EMS reports patient was hallucinating and hyperactive en route.
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
## 2024-01-19 NOTE — CONSULTS
Diagnostic Evaluation Consultation  Crisis Assessment    Patient Name: Lilibeth Da Silva  Age:  28 year old  Legal Sex: female  Gender Identity: female  Pronouns:   Race: White  Ethnicity: Not  or   Language: English    Patient was assessed: Virtual: [facility] Crisis Assessment Start Time: 1056 Crisis Assessment Stop Time: 1106  Patient location: [facility] EMERGENCY DEPARTMENT       ED16    Referral Data and Chief Complaint  Lilibeth Da Silva presents to the ED via EMS. Patient is presenting to the ED for the following concerns: Significant behavioral change.   Factors that make the mental health crisis life threatening or complex are:  Pt brought in for concern for hallucinations.      Informed Consent and Assessment Methods  Explained the crisis assessment process, including applicable information disclosures and limits to confidentiality, assessed understanding of the process, and obtained consent to proceed with the assessment.  Assessment methods included conducting a formal interview with patient, review of medical records, collaboration with medical staff, and obtaining relevant collateral information from family and community providers when available: done     Patient response to interventions: no evidence of understanding  Coping skills were attempted to reduce the crisis:  None identified.     History of the Crisis   Pt brought via EMS from home for hallucinations. She was seen in the ED yesterday for a seizure and discharged, reportin auditory and visual hallucinations since 7pm last night. Denies SI per medics. Met with pt virtually, she is observed to be hyperactive and restless, cannot focus on  questions being asked. She either does not respond to responds inappropriately to all questions. She is observed almost constantly laughing, smiling and talking to herself.    Brief Psychosocial History  Family:  Single, Children no  Support System:  Significant Other, Parent(s),  Sibling(s)  Employment Status:   (unable to assess)  Source of Income:  unable to assess  Financial Environmental Concerns:   (unable to assess)  Current Hobbies:   (unable to assess)  Barriers in Personal Life:   (unable to assess)    Significant Clinical History  Current Anxiety Symptoms:  racing thoughts  Current Depression/Trauma:  difficulty concentrating  Current Somatic Symptoms:  racing thoughts  Current Psychosis/Thought Disturbance:  forgetful, inattentive, hyperactive, distractability, elated mood, auditory hallucinations, visual hallucinations  Current Eating Symptoms:     Chemical Use History:  Alcohol: Other (comments) (Per father, pt has been drinking hard liquor, unsure of more details.)  Benzodiazepines: None  Opiates: Other (comments) (Hx use, per chart has been on Suboxone, per father hx heroin use, clean now for 7-8 years.)  Cocaine: None  Marijuana:  (Unknown, but UDS positive for cannabis.)  Other Use: None  Withdrawal Symptoms: Hallucinations, Seizures   Past diagnosis:  Anxiety Disorder, Depression, Substance Use Disorder  Family history:  Substance Use Disorder  Past treatment:  Psychiatric Medication Management, Primary Care, Inpatient Hospitalization  Details of most recent treatment:  Per chart, pt hx with [facility] treatment, suboxone therapy, psychiatric medications and IP admission in adolescence.     Collateral Information  Is there collateral information: Yes     Collateral information name, relationship, phone number:  [phone] Franco Da Silva (father);  [phone] Jaqueline Petersen (mother)    What happened today: He was not aware.     What is different about patient's functioning: Reports that he had not talked to pt for about three weeks until last night after her brother told him that she had a seizure. She sounded ok to him. She talked to him briefly about a conflict getting 'blown out of proportion' with pt's boyfriend who she lives with. He knows that she has been using  alcohol, hard liquor, but unsure of details. Reports that she had a 'really bad drug problem' with heroin but has now been sober for 7-8 years. Reports that pt's mother has substance use issues ongoing.     Has patient made comments about wanting to kill themselves/others:  no    If d/c is recommended, can they take part in safety/aftercare planning:  yes       Risk Assessment  [place] Suicide Severity Rating Scale Full Clinical Version:  Suicidal Ideation  Q1 Wish to be Dead (Lifetime): No (per medics. Writer unable to assess.)  Q2 Non-Specific Active Suicidal Thoughts (Lifetime):  (unable to assess)     Suicidal Behavior (Lifetime)  Actual Attempt (Lifetime):  (unable to assess)  Has subject engaged in non-suicidal self-injurious behavior? (Lifetime):  (unable to assess)  Interrupted Attempts (Lifetime):  (unable to assess)  Aborted or Self-Interrupted Attempt (Lifetime):  (unable to assess)  Preparatory Acts or Behavior (Lifetime):  (unable to assess)    [place] Suicide Severity Rating Scale Recent:   Suicidal Ideation (Recent)  Q1 Wished to be Dead (Past Month):  (unable to assess)  Q2 Suicidal Thoughts (Past Month):  (unable to assess)  Intensity of Ideation (Recent)  Most Severe Ideation Rating (Past 1 Month):  (unable to assess)  Frequency (Past 1 Month):  (unable to assess)  Duration (Past 1 Month):  (unable to assess)  Controllability (Past 1 Month):  (unable to assess)  Deterrents (Past 1 Month):  (unable to assess)  Reasons for Ideation (Past 1 Month):  (unable to assess)  Suicidal Behavior (Recent)  Actual Attempt (Past 3 Months):  (unable to assess)  Has subject engaged in non-suicidal self-injurious behavior? (Past 3 Months):  (unable to assess)  Interrupted Attempts (Past 3 Months):  (unable to assess)  Aborted or Self-Interrupted Attempt (Past 3 Months):  (unable to assess)  Preparatory Acts or Behavior (Past 3 Months):  (unable to assess)    Environmental or Psychosocial Events: challenging  interpersonal relationships, ongoing abuse of substances  Protective Factors: Protective Factors: strong bond to family unit, community support, or employment, good treatment engagement, sense of importance of health and wellness, help seeking    Does the patient have thoughts of harming others? Feels Like Hurting Others:  (unable to assess)  Previous Attempt to Hurt Others:  (unable to assess)  Current presentation: Confused  Is the patient engaging in sexually inappropriate behavior?: no  Duty to warn initiated: no    Is the patient engaging in sexually inappropriate behavior?  no        Mental Status Exam   Affect: Dramatic  Appearance: Appropriate  Attention Span/Concentration: Inattentive  Eye Contact: Variable    Fund of Knowledge: Delayed   Language /Speech Content: Expressive Speech  Language /Speech Volume: Soft  Language /Speech Rate/Productions: Normal  Recent Memory: Poor  Remote Memory: Poor  Mood: Anxious, Euphoric  Orientation to Person: Yes   Orientation to Place:  (unable to assess due to nature of symtpoms)  Orientation to Time of Day:  (unable to assess due to nature of symtpoms)  Orientation to Date:  (unable to assess due to nature of symtpoms)     Situation (Do they understand why they are here?):  (unable to assess due to nature of symtpoms)  Psychomotor Behavior: Hyperactive  Thought Content: Hallucinations  Thought Form: Flight of Ideas       Medication  Psychotropic medications:   Medication Orders - Psychiatric (From admission, onward)      Start     Dose/Rate Route Frequency Ordered Stop    01/19/24 1140  FLUoxetine (PROzac) capsule 20 mg        Note to Pharmacy: PTA Sig:Take 1 capsule (20 mg) by mouth daily      20 mg Oral DAILY 01/19/24 1137      01/19/24 1120  QUEtiapine (SEROquel) tablet 50 mg         50 mg Oral 2 TIMES DAILY 01/19/24 1118      01/19/24 0924  diazepam (VALIUM) tablet 10 mg        See Hyperspace for full Linked Orders Report.    10 mg Oral EVERY 30 MIN PRN 01/19/24  0925 01/19/24 0924  diazepam (VALIUM) injection 5-10 mg        See Hyperspace for full Linked Orders Report.    5-10 mg  over 1-4 Minutes Intravenous EVERY 30 MIN PRN 01/19/24 0925      01/19/24 0923  OLANZapine zydis (zyPREXA) ODT tab 5-10 mg        See Hyperspace for full Linked Orders Report.    5-10 mg Oral EVERY 6 HOURS PRN 01/19/24 0925      01/19/24 0923  haloperidol lactate (HALDOL) injection 2.5-5 mg        See Hyperspace for full Linked Orders Report.    2.5-5 mg  over 1 Minutes Intravenous EVERY 6 HOURS PRN 01/19/24 0925               Current Care Team  Patient Care Team:  Yohannes Gipson MD (Inactive) as PCP - General (Family Practice)    Diagnosis  Patient Active Problem List   Diagnosis Code    Polysubstance abuse (H) F19.10    Tobacco dependence F17.200    IV drug abuse (H) F19.10    Contraception management Z30.9    High risk sexual behavior Z72.51    Depression with anxiety F41.8    ODD (oppositional defiant disorder) F91.3    Health Care Home Z76.89    Opiate withdrawal (H) F11.93    Acute alcoholic hallucinosis (H) F10.951    Acute alcoholic intoxication in alcoholism with complication (H) F10.229    Substance use disorder F19.90    Alcohol-related disorder (H24) F10.99       Primary Problem This Admission  Alcohol related disorder F10.99      Clinical Summary and Substantiation of Recommendations   Pt with history gathered primarily through chart as pt was not able to fully participate in assessment due to nature and severity of symptoms. Per chart, has remote hx of ODD with [facility] in adolescence, more recently seen related to depression, anxiety, polysubstance and opioid use. Today pt is disorganized and inattentive, exhibiting signs of psychosis with laughing and talking to people who are not there. Per ED MD, pt is being medically admitted for concerns for 'alcoholic hallucinosis.'    Patient coping skills attempted to reduce the crisis:  None identified.    Disposition  Recommended  disposition: Medical Admission        Reviewed case and recommendations with attending provider. Attending Name: Mario Valdez MD       Attending concurs with disposition: yes       Patient and/or validated legal guardian concurs with disposition:   yes       Final disposition:  medical    Assessment Details   Total duration spent with the patient: 10 min     CPT code(s) utilized: Non-Billable    Shawna Mario [facility], Psychotherapist  [facility] - Triage & Transition Services  Callback: [phone]

## 2024-01-20 LAB
ALBUMIN SERPL BCG-MCNC: 4.1 G/DL (ref 3.5–5.2)
ALP SERPL-CCNC: 48 U/L (ref 40–150)
ALT SERPL W P-5'-P-CCNC: 18 U/L (ref 0–50)
ANION GAP SERPL CALCULATED.3IONS-SCNC: 9 MMOL/L (ref 7–15)
AST SERPL W P-5'-P-CCNC: 50 U/L (ref 0–45)
BASOPHILS # BLD AUTO: 0.1 10E3/UL (ref 0–0.2)
BASOPHILS NFR BLD AUTO: 1 %
BILIRUB SERPL-MCNC: 0.6 MG/DL
BUN SERPL-MCNC: 5.8 MG/DL (ref 6–20)
CALCIUM SERPL-MCNC: 9 MG/DL (ref 8.6–10)
CHLORIDE SERPL-SCNC: 107 MMOL/L (ref 98–107)
CREAT SERPL-MCNC: 0.64 MG/DL (ref 0.51–0.95)
DEPRECATED HCO3 PLAS-SCNC: 28 MMOL/L (ref 22–29)
EGFRCR SERPLBLD CKD-EPI 2021: >90 ML/MIN/1.73M2
EOSINOPHIL # BLD AUTO: 0.1 10E3/UL (ref 0–0.7)
EOSINOPHIL NFR BLD AUTO: 2 %
ERYTHROCYTE [DISTWIDTH] IN BLOOD BY AUTOMATED COUNT: 15 % (ref 10–15)
FENTANYL UR QL: NORMAL
FOLATE SERPL-MCNC: 10.8 NG/ML (ref 4.6–34.8)
GLUCOSE SERPL-MCNC: 80 MG/DL (ref 70–99)
HCT VFR BLD AUTO: 36 % (ref 35–47)
HGB BLD-MCNC: 12.3 G/DL (ref 11.7–15.7)
IMM GRANULOCYTES # BLD: 0 10E3/UL
IMM GRANULOCYTES NFR BLD: 0 %
LYMPHOCYTES # BLD AUTO: 1.8 10E3/UL (ref 0.8–5.3)
LYMPHOCYTES NFR BLD AUTO: 25 %
MCH RBC QN AUTO: 38.1 PG (ref 26.5–33)
MCHC RBC AUTO-ENTMCNC: 34.2 G/DL (ref 31.5–36.5)
MCV RBC AUTO: 112 FL (ref 78–100)
MONOCYTES # BLD AUTO: 1 10E3/UL (ref 0–1.3)
MONOCYTES NFR BLD AUTO: 13 %
NEUTROPHILS # BLD AUTO: 4.4 10E3/UL (ref 1.6–8.3)
NEUTROPHILS NFR BLD AUTO: 59 %
NRBC # BLD AUTO: 0 10E3/UL
NRBC BLD AUTO-RTO: 0 /100
PLATELET # BLD AUTO: 235 10E3/UL (ref 150–450)
POTASSIUM SERPL-SCNC: 3.5 MMOL/L (ref 3.4–5.3)
POTASSIUM SERPL-SCNC: 3.5 MMOL/L (ref 3.4–5.3)
PROT SERPL-MCNC: 6.8 G/DL (ref 6.4–8.3)
RBC # BLD AUTO: 3.23 10E6/UL (ref 3.8–5.2)
SODIUM SERPL-SCNC: 144 MMOL/L (ref 135–145)
WBC # BLD AUTO: 7.3 10E3/UL (ref 4–11)

## 2024-01-20 PROCEDURE — 85004 AUTOMATED DIFF WBC COUNT: CPT | Performed by: STUDENT IN AN ORGANIZED HEALTH CARE EDUCATION/TRAINING PROGRAM

## 2024-01-20 PROCEDURE — 80053 COMPREHEN METABOLIC PANEL: CPT | Performed by: INTERNAL MEDICINE

## 2024-01-20 PROCEDURE — 80053 COMPREHEN METABOLIC PANEL: CPT | Performed by: STUDENT IN AN ORGANIZED HEALTH CARE EDUCATION/TRAINING PROGRAM

## 2024-01-20 PROCEDURE — 99418 PROLNG IP/OBS E/M EA 15 MIN: CPT | Performed by: NURSE PRACTITIONER

## 2024-01-20 PROCEDURE — 80307 DRUG TEST PRSMV CHEM ANLYZR: CPT

## 2024-01-20 PROCEDURE — 250N000013 HC RX MED GY IP 250 OP 250 PS 637: Performed by: INTERNAL MEDICINE

## 2024-01-20 PROCEDURE — 36415 COLL VENOUS BLD VENIPUNCTURE: CPT | Performed by: STUDENT IN AN ORGANIZED HEALTH CARE EDUCATION/TRAINING PROGRAM

## 2024-01-20 PROCEDURE — 120N000002 HC R&B MED SURG/OB UMMC

## 2024-01-20 PROCEDURE — 99232 SBSQ HOSP IP/OBS MODERATE 35: CPT | Performed by: INTERNAL MEDICINE

## 2024-01-20 PROCEDURE — 250N000013 HC RX MED GY IP 250 OP 250 PS 637: Performed by: STUDENT IN AN ORGANIZED HEALTH CARE EDUCATION/TRAINING PROGRAM

## 2024-01-20 PROCEDURE — 80349 CANNABINOIDS NATURAL: CPT | Performed by: INTERNAL MEDICINE

## 2024-01-20 PROCEDURE — 82746 ASSAY OF FOLIC ACID SERUM: CPT | Performed by: STUDENT IN AN ORGANIZED HEALTH CARE EDUCATION/TRAINING PROGRAM

## 2024-01-20 PROCEDURE — 99255 IP/OBS CONSLTJ NEW/EST HI 80: CPT | Performed by: NURSE PRACTITIONER

## 2024-01-20 PROCEDURE — 999N000157 HC STATISTIC RCP TIME EA 10 MIN

## 2024-01-20 RX ORDER — POTASSIUM CHLORIDE 750 MG/1
40 TABLET, EXTENDED RELEASE ORAL ONCE
Status: COMPLETED | OUTPATIENT
Start: 2024-01-20 | End: 2024-01-20

## 2024-01-20 RX ORDER — NALOXONE HYDROCHLORIDE 0.4 MG/ML
INJECTION, SOLUTION INTRAMUSCULAR; INTRAVENOUS; SUBCUTANEOUS
Status: DISCONTINUED
Start: 2024-01-20 | End: 2024-01-20 | Stop reason: WASHOUT

## 2024-01-20 RX ORDER — POTASSIUM CHLORIDE 750 MG/1
20 TABLET, EXTENDED RELEASE ORAL ONCE
Status: COMPLETED | OUTPATIENT
Start: 2024-01-20 | End: 2024-01-20

## 2024-01-20 RX ADMIN — Medication 5 MG: at 23:55

## 2024-01-20 RX ADMIN — DIAZEPAM 10 MG: 5 TABLET ORAL at 09:17

## 2024-01-20 RX ADMIN — THIAMINE HCL TAB 100 MG 100 MG: 100 TAB at 09:22

## 2024-01-20 RX ADMIN — Medication 1 TABLET: at 09:18

## 2024-01-20 RX ADMIN — FOLIC ACID 1 MG: 1 TABLET ORAL at 09:18

## 2024-01-20 RX ADMIN — OLANZAPINE 10 MG: 5 TABLET, ORALLY DISINTEGRATING ORAL at 01:26

## 2024-01-20 ASSESSMENT — ACTIVITIES OF DAILY LIVING (ADL)
ADLS_ACUITY_SCORE: 18
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
## 2024-01-20 NOTE — CODE/RAPID RESPONSE
Rapid Response Team Note    Assessment   In assessment a rapid response was called on Lilibeth Da Silva due to  unresponsiveness . This presentation is likely due to medication stacking.     On arrival to the room, patient was saturating 100% on room air, she appeared to be breathing comfortably, was not agonal nor apneic. Upon painful stimulation to her nailbed, she grimmaced, moaned, and withdraw from the pain. Her GCS was approximately 8, but at this moment she seemed to be protecting her airway. We hooked her up to EtCO2 monitoring which revealed an IPI of 10 and CO2 of 37-40. This does not appear to be a seizure given patients appropriate response to stimuli, no lose of bowel/bladder function, no tongue bite and intact pupils. Patient received multiple doses of valium per the [facility] protocols, most recently at about 0130, a dose of olanzapine around 2130, and her bedtime dose of seroquel around midnight. Patient is certainly on the cusp of needing intubation for airway protection, that said, this is likely secondary to medication stacking and she is currently protecting her airway. In discussion with the hospitalist, we decided that the patient likely will improve as the medications wear off and we will have close surveillance of her vitals and clinical presentation with a quick trigger to call another rapid and reconsider intubation if she were to deteriorate.      Plan   -  Started telemetry  -  Start EtCO2 monitoring  -  HOLD all mind altering medications  -  Frequent GCS checks and neuro exams  -  The Internal Medicine primary team was able to be reached and they are in agreement with the above plan.  -  Disposition: The patient will be transferred to [facility].  -  Reassessment and plan follow-up will be performed by the primary team  -  Please reach out to RRT team if GCS worsens or concern for airway protection worsens.       Dimitris Mccann, NP  [facility] RRT [facility] Job Code Contact #[phone]  [facility]  "Paging/Directory    Hospital Course   Brief Summary of events leading to rapid response:   Per chart review: \"Lilibeth Da Silva is a 28 year old female with a history of substance use disorder on suboxone, anxiety, depression, and alcohol use disorder, who is presenting today in acute psychosis.\"    Admission Diagnosis:   Acute alcoholic hallucinosis (H) [F10.951]  Acute alcoholic intoxication in alcoholism with complication (H) [F10.229]  Substance use disorder [F19.90]    Physical Exam   Temp: 98.2  F (36.8  C) Temp  Min: 97.6  F (36.4  C)  Max: 98.2  F (36.8  C)  Resp: 16 Resp  Min: 16  Max: 25  SpO2: 97 % SpO2  Min: 93 %  Max: 100 %  Pulse: 73 Pulse  Min: 73  Max: 105    No data recorded  BP: 106/68 Systolic (24hrs), Av , Min:106 , Max:148   Diastolic (24hrs), Av, Min:68, Max:90     I/Os: I/O last 3 completed shifts:  In: 240 [P.O.:240]  Out: -      Exam:   General: in no acute distress  Mental Status: altered level of consciousness based on her GCS of 8 .      Significant Results and Procedures   Lactic Acid: No results for input(s): \"LACT\", \"LACTS\" in the last 25289 hours.  CBC:   Recent Labs   Lab Test 24  0833 24  0204   WBC 8.2 7.8   HGB 13.1 11.9   HCT 36.1 32.9*    199        "

## 2024-01-20 NOTE — PROGRESS NOTES
MEDICINE CROSS COVER:  Responded to rapid response called at 0328 for patient being unresponsive.     Per chart review and nursing staff that patient is currently being treated for alcohol withdrawal. She is on CIWA scoring and has PRN valium available for withdrawal symptoms. She also has PRN anti-psychotics ordered for agitation, as well as scheduled gabapentin taper, scheduled clonidine, and PTA seroquel scheduled BID. Per RN and MAR, she received:  - Gabapentin 1,200 mg at 2356  - Clonidine 0.1 mg at 2357   - PO Valium 10 mg at 2357  - Seroquel 50 mg at 2357  - Zyprexa ODT 10 mg at 0126    Prior to receiving these medications, nursing staff report she was very awake and agitated, which is what prompted her to be given Zyprexa. After receiving all of these medications, she became somnolent and difficult to arouse, which is what prompted the RRT to be called.     Evaluated patient with MICU CARLOS at bedside. Patient had a pulse and was breathing spontaneously, but was sleeping and not aroused by voice or light touch. With nail bed pressure, she woke up briefly, opening her eyes and moving all of her extremities, as well as moaning, prior to going back to sleep. Vitals were stable (see below).    /79 (BP Location: Left arm, Patient Position: Semi-Torres's, Cuff Size: Adult Regular)   Pulse 73   Temp 98.2  F (36.8  C) (Oral)   Resp 16   SpO2 97%     Given that she was awake and active prior to receipt of the sedating medications listed above, and that she was able to be aroused with nail bed pressure, it is felt that her current somnolence is likely secondary to receiving multiple sedating medications in a short time frame. Per evaluation and discussion with MICU CARLOS, she appears to be protecting her airway at this time, and there is no indication for intubation unless she were to clinically decline.     Further discussed administration of both narcan and flumazenil as a way to reverse her current  somnolence. Decided against giving narcan as she has not received any opioids. Did not administer flumazenil due to report of possible seizure-like activity recently. Instead, will opt for closer monitoring while awaiting the medication effects to wear off.     PLAN:   - Upgrade status to [facility]  - Start cardiac monitoring  - Start end tidal CO2 monitoring  - Hold the following sedating medications: Suboxone - already held due to concern that patient has not been taking this, clonidine, gabapentin, Seroquel, Zyprexa, Haldol  - Will not hold PRN Valium given patient is admitted for alcohol withdrawal, but instructed nursing to not administer this medication until her mental status improves  - Suspect that her somnolence will resolve as the medication effects wear off; however, if she clinically decompensates will re-engage the ICU for further evaluation    Shanell Gilmore MD  Internal Medicine/Pediatrics Hospitalist   of Internal Medicine and Pediatrics  [facility]  Pager: [phone]

## 2024-01-20 NOTE — PROGRESS NOTES
[facility]    Medicine Progress Note - Hospitalist Service, GOLD TEAM 18    Date of Admission:  1/19/2024    Assessment & Plan   Lilibeth Da Silva is a 28 year old female with a history of substance use disorder on suboxone, anxiety, depression, and alcohol use disorder, who is presented and was admitted due to acute psychosis.      # Acute Psychosis   # Alcohol Use Disorder, Concern for withdrawal and intoxication  -- Alcohol level elevated on admission 0.12 with recent history of seizure activity prior to arrival.   -- Per chart review, has been drinking heavily.   -- She has also been having visual and auditory hallucinations. Agitated, expansive mood.   Psychiatry consult, appreciate recs; patient to remains on 72 HH  CIWA protocol with valium   - TSH, vitamin B12, folate levels ordered  Gabapentin and clonidine ordered  Folic acid and thiamine ordered     #Hypokalemia, resolved: Likely in the setting of alcohol use and poor nutrition. Replete per protocol.   #Elevated AST, improving: Could be elevated in the setting of injury to liver, cardiac muscle, skeletal muscle, kidney, and/or brain. Will continue to trend for now, as no other s/s of end organ damage.  #Macrocytic Anemia: Likely in the setting of alcohol use with associated folic acid and/or vitamin B12 deficiency. Folic acid and thiamine tests ordered   #SHIRLEY: Continue suboxone TID  #Mood Disorder: Previously on Fluoxetine 20 mg, but stopped taking. Will not resume in the inpatient setting for now.              Diet: Combination Diet Regular Diet Adult    DVT Prophylaxis: Pneumatic Compression Devices  Thapa Catheter: Not present  Lines: None     Cardiac Monitoring: ACTIVE order. Indication: Drug overdose (24 hours)  Code Status: Full Code      Clinically Significant Risk Factors        # Hypokalemia: Lowest K = 2.9 mmol/L in last 2 days, will replace as needed                             Disposition Plan  On  72 HH, ongoing detox and then transfer to inpatient psych.      Expected Discharge Date: 01/21/2024                    TIFFANY TRIMBLE MD  Hospitalist Service, GOLD TEAM 18  [facility]  Securely message with [facility] (more info)  Text page via [employer] Paging/Directory   See signed in provider for up to date coverage information  ______________________________________________________________________    Interval History   - afebrile and HDS  - patient asking to leave the hospital today    Physical Exam   Vital Signs: Temp: 97.6  F (36.4  C) Temp src: Oral BP: (!) 112/90 Pulse: 67   Resp: 16 SpO2: 98 % O2 Device: None (Room air)    Weight: 0 lbs 0 oz    General Appearance: Sitting comfortably in bed, on room air, in no acute distress of discomfort  HEENT: PERRL: EOMI; moist mucous membrane w/o lesions  Neck: No JVD  Pulmonary: Clear to auscultation bilaterally, no wheezes or crackles  CVS: Regular rhythm, no murmurs, rubs or gallops  GI: BS (+), soft nontender, no rebound or guarding   Extremities: No LE edema.   Skin: No rashes or lesions  Neurologic: A&O x3      Medical Decision Making       45 MINUTES SPENT BY ME on the date of service doing chart review, history, exam, documentation & further activities per the note.      Data   ------------------------- PAST 24 HR DATA REVIEWED -----------------------------------------------    I have personally reviewed the following data over the past 24 hrs:    7.3  \   12.3   / 235     144 107 5.8 (L) /  80   3.5; 3.5 28 0.64 \     ALT: 18 AST: 50 (H) AP: 48 TBILI: 0.6   ALB: 4.1 TOT PROTEIN: 6.8 LIPASE: N/A       Imaging results reviewed over the past 24 hrs:   No results found for this or any previous visit (from the past 24 hour(s)).

## 2024-01-20 NOTE — PROGRESS NOTES
Medicine Cross Cover Note      Pharmacy reached out that patient not taking suboxone as of 1/18 and has no fill history for this on  or sure scripts. Will hold tonight, day teal please reach out to addiction medicine in the AM for assistance with initiation. Will add fentanyl level as well per psych recs.     Sofiya Figueredo PA-C

## 2024-01-20 NOTE — PLAN OF CARE
Goal Outcome Evaluation    Vitals: within normal limit,( see flowsheet)    Neuro: lethargic and confused. At baseline patient is Alert, but as rom 0300, patient became lethargic      Activity: independent. But was unresponsive as from 03:00 am.     Other:  At around 1:20 am, patient was pulling monitors and trying to lift the bed up  stating that her siblings were under the bed.     At around 3 am staff tried to give medication to patient per CIWA scoring but patient was unresponsive.staff and other staff performed sternal kurt, but patient did not wake up. Flyer was called, Rapid response called, rapid response came in, doctors too came in, and patient responded to painful stimuli applied on nail bed. At this point doctor changed patient's status to IMC and telemetry (continuous cardiac monitoring) and capnograph.       At around 4: 40 am, patient woke up and started pulling the bed, staff redirected patient and patient laid back in bed.

## 2024-01-21 VITALS
SYSTOLIC BLOOD PRESSURE: 125 MMHG | DIASTOLIC BLOOD PRESSURE: 82 MMHG | TEMPERATURE: 98 F | RESPIRATION RATE: 20 BRPM | OXYGEN SATURATION: 96 % | HEART RATE: 77 BPM

## 2024-01-21 LAB
CREAT UR-MCNC: 49 MG/DL
POTASSIUM SERPL-SCNC: 3.9 MMOL/L (ref 3.4–5.3)

## 2024-01-21 PROCEDURE — 84132 ASSAY OF SERUM POTASSIUM: CPT | Performed by: INTERNAL MEDICINE

## 2024-01-21 PROCEDURE — 250N000013 HC RX MED GY IP 250 OP 250 PS 637: Performed by: STUDENT IN AN ORGANIZED HEALTH CARE EDUCATION/TRAINING PROGRAM

## 2024-01-21 PROCEDURE — 36415 COLL VENOUS BLD VENIPUNCTURE: CPT | Performed by: INTERNAL MEDICINE

## 2024-01-21 PROCEDURE — 99232 SBSQ HOSP IP/OBS MODERATE 35: CPT | Performed by: INTERNAL MEDICINE

## 2024-01-21 PROCEDURE — 120N000002 HC R&B MED SURG/OB UMMC

## 2024-01-21 RX ADMIN — Medication 1 TABLET: at 08:55

## 2024-01-21 RX ADMIN — THIAMINE HCL TAB 100 MG 100 MG: 100 TAB at 08:55

## 2024-01-21 RX ADMIN — FOLIC ACID 1 MG: 1 TABLET ORAL at 08:55

## 2024-01-21 ASSESSMENT — ACTIVITIES OF DAILY LIVING (ADL)
ADLS_ACUITY_SCORE: 18
ADLS_ACUITY_SCORE: 19
ADLS_ACUITY_SCORE: 18
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
## 2024-01-21 NOTE — PROGRESS NOTES
[facility]    Medicine Progress Note - Hospitalist Service, GOLD TEAM 18    Date of Admission:  1/19/2024    Assessment & Plan   Lilibeth Da Silva is a 28 year old female with a history of substance use disorder on suboxone, anxiety, depression, and alcohol use disorder, who is presented and was admitted due to acute psychosis.      # Acute Psychosis   # Alcohol Use Disorder, Concern for withdrawal and intoxication  # Recent history of withdrawal seizure  # Hallucinations, bizarre behavior  -- Alcohol level elevated on admission 0.12 with recent history of seizure activity prior to arrival.   -- Per chart review, has been drinking heavily.   -- She has also been having visual and auditory hallucinations. Agitated, expansive mood.   Psychiatry consult, appreciate recs; patient to remains on 72 HH  Since patient is scoring minimally on CIWA, will recommend transfer to inpatient psychiatry.  CIWA protocol with valium   - TSH, vitamin B12, folate levels ordered  Gabapentin and clonidine ordered  Folic acid and thiamine ordered  Seizure precautions     #Hypokalemia, resolved: Likely in the setting of alcohol use and poor nutrition. Replete per protocol.   #Elevated AST, improving: Could be elevated in the setting of injury to liver, cardiac muscle, skeletal muscle, kidney, and/or brain. Will continue to trend for now, as no other s/s of end organ damage.  #Macrocytic Anemia: Likely in the setting of alcohol use with associated folic acid and/or vitamin B12 deficiency. Folic acid and thiamine tests ordered   #SHIRLEY: Continue suboxone TID  #Mood Disorder: Previously on Fluoxetine 20 mg, but stopped taking. Will not resume in the inpatient setting for now.              Diet: Combination Diet Regular Diet Adult    DVT Prophylaxis: Pneumatic Compression Devices  Thapa Catheter: Not present  Lines: None     Cardiac Monitoring: None  Code Status: Full Code      Clinically Significant  Risk Factors                                    Disposition Plan  On 72 HH. Patient completed detox. Ready for transfer to inpatient psych.     Expected Discharge Date: 01/21/2024                    TIFFANY TRIMBLE MD  Hospitalist Service, GOLD TEAM 18  M [facility]  Securely message with [facility] (more info)  Text page via [facility] Paging/Directory   See signed in provider for up to date coverage information  ______________________________________________________________________    Interval History   - afebrile and HDS  - patient asking to leave the hospital today  - per sitter, patient has been paranoid mostly in the evening.     Physical Exam   Vital Signs: Temp: 99.5  F (37.5  C) Temp src: Oral BP: 121/79 Pulse: 75   Resp: 16 SpO2: 100 % O2 Device: None (Room air)    Weight: 0 lbs 0 oz    General Appearance: Sitting comfortably in bed, on room air, in no acute distress of discomfort  HEENT: PERRL: EOMI; moist mucous membrane w/o lesions  Neck: No JVD  Pulmonary: Clear to auscultation bilaterally, no wheezes or crackles  CVS: Regular rhythm, no murmurs, rubs or gallops  GI: BS (+), soft nontender, no rebound or guarding   Extremities: No LE edema.   Skin: No rashes or lesions  Neurologic: A&O x3      Medical Decision Making       45 MINUTES SPENT BY ME on the date of service doing chart review, history, exam, documentation & further activities per the note.      Data   ------------------------- PAST 24 HR DATA REVIEWED -----------------------------------------------    I have personally reviewed the following data over the past 24 hrs:    N/A  \   N/A   / N/A     N/A N/A N/A /  N/A   N/A N/A N/A \     ALT: N/A AST: N/A AP: N/A TBILI: N/A   ALB: N/A TOT PROTEIN: N/A LIPASE: N/A       Imaging results reviewed over the past 24 hrs:   No results found for this or any previous visit (from the past 24 hour(s)).
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
## 2024-01-21 NOTE — PLAN OF CARE
Problem: Adult Inpatient Plan of Care  Goal: Plan of Care Review  Description: The Plan of Care Review/Shift note should be completed every shift.  The Outcome Evaluation is a brief statement about your assessment that the patient is improving, declining, or no change.  This information will be displayed automatically on your shift  note.  1/21/2024 0308 by Whitney Sosa, RN  Outcome: Progressing  Flowsheets (Taken 1/21/2024 0308)  Outcome Evaluation: A&Ox4. Stated she is feeling much better than previous night. Denies visual hallucination for this writer. Jerry noted signs of paranoia. Sitter remains at bedside for 72 hour hold and safety. No valium given this shift per [facility].   Plan of Care Reviewed With: patient  Overall Patient Progress: improving

## 2024-01-22 ENCOUNTER — TELEPHONE (OUTPATIENT)
Dept: BEHAVIORAL HEALTH | Facility: CLINIC | Age: 29
End: 2024-01-22

## 2024-01-22 LAB
HOLD SPECIMEN: NORMAL
POTASSIUM SERPL-SCNC: 3.5 MMOL/L (ref 3.4–5.3)

## 2024-01-22 PROCEDURE — 99233 SBSQ HOSP IP/OBS HIGH 50: CPT

## 2024-01-22 PROCEDURE — 99239 HOSP IP/OBS DSCHRG MGMT >30: CPT | Performed by: INTERNAL MEDICINE

## 2024-01-22 PROCEDURE — 250N000013 HC RX MED GY IP 250 OP 250 PS 637: Performed by: STUDENT IN AN ORGANIZED HEALTH CARE EDUCATION/TRAINING PROGRAM

## 2024-01-22 PROCEDURE — G0463 HOSPITAL OUTPT CLINIC VISIT: HCPCS | Mod: 25

## 2024-01-22 PROCEDURE — 97602 WOUND(S) CARE NON-SELECTIVE: CPT

## 2024-01-22 PROCEDURE — 84132 ASSAY OF SERUM POTASSIUM: CPT | Performed by: INTERNAL MEDICINE

## 2024-01-22 PROCEDURE — 36415 COLL VENOUS BLD VENIPUNCTURE: CPT | Performed by: INTERNAL MEDICINE

## 2024-01-22 RX ORDER — GINSENG 100 MG
CAPSULE ORAL 2 TIMES DAILY
Qty: 14.2 G | Refills: 0 | Status: SHIPPED | OUTPATIENT
Start: 2024-01-22

## 2024-01-22 RX ORDER — GINSENG 100 MG
CAPSULE ORAL 2 TIMES DAILY
Status: DISCONTINUED | OUTPATIENT
Start: 2024-01-22 | End: 2024-01-22 | Stop reason: HOSPADM

## 2024-01-22 RX ADMIN — FOLIC ACID 1 MG: 1 TABLET ORAL at 08:43

## 2024-01-22 RX ADMIN — Medication 1 TABLET: at 08:42

## 2024-01-22 RX ADMIN — THIAMINE HCL TAB 100 MG 100 MG: 100 TAB at 08:42

## 2024-01-22 ASSESSMENT — ACTIVITIES OF DAILY LIVING (ADL)
ADLS_ACUITY_SCORE: 19

## 2024-01-22 NOTE — DISCHARGE INSTRUCTIONS
Behavioral Healthcare Providers Scheduling:  During your hospitalization, you were seen by a licensed mental health professional through [facility] ([facility])  for a brief mental health assessment and/or psychotherapy at [facility].  It is recommended that you follow up with your established providers (psychiatrist, mental health therapist, and/or primary care doctor - as relevant) as soon as possible. Coordinators from [facility] will be calling you in the next 24-48 hours to ensure that you have the resources you need. You can also contact [facility] coordinators directly at [phone]. You will need valid insurance to schedule an appointment.    Resources:    Free Counseling Services  COUNSELING SERVICES ARE COMPLETELY FREE AND ANONYMOUS. [facility] is a non-profit, non-Jainism organization, All of our professional counselors volunteer their time. Counseling is for individuals, couples, or families. No appointment (during clinics) or insurance is needed.    CLINIC HOURS: Please come/call/login only during clinic hours.  M, W, F:  1:00p - 3:00p for both in-person and virtual (phone and login) services    M, T, W, Th: 5:30p - 7:30p virtual services only    IN PERSON SERVICES: Come to [address] [place] [address] on Monday, Wednesday or Friday from 1 - 3 pm.    BY PHONE: Call Zoom at [phone]. When prompted, enter the meeting ID: [phone].    BY COMPUTER: Go to [email or web address]    ONE TAP MOBILE ON [employer] PHONES: To attend a clinic using the smart cell phone  one-tap mobile  button, click on this link and press the dial button on your phone:  [phone]  When Zoom answers, it may ask for a meeting id (you won t have one), so just wait until it asks you to simply press the # (number) button.    If the phone line is busy, try the phone numbers below. Try each phone number. Or, use landline phone-in  "instructions below.  [phone]  [phone]  [phone]  [phone]  [phone]    Services for [place] Speakers  Services for [place] speakers remain the same.  The client can simply call our main number ([phone]), dial extension 2 and leave a message with the call-back phone number.    Counseling by Appointment  If you are interested in ongoing counseling, the first step is to see a counselor at a clinic. Ongoing counseling is arranged by request during the clinic.    Free Counseling Services  Counseling services are completely free and anonymous, with no appointment needed. SOME CLINICS ARE NOW IN PERSON! All of our professional counselors volunteer their time.    [place] WARM LINE  [facility]  [phone] or [phone]  [email or web address]  [address], [address]  [place] [address]  Call [phone]  Text \"Support\" to [phone]    CRISIS:  Text or Call [phone]       "

## 2024-01-22 NOTE — TELEPHONE ENCOUNTER
S: [facility] , Charge Nurse Dahiana  calling at 10:35 AM   about a 28 year old/Female presenting with AH VH      B: Pt arrived via EMS. Presenting problem, stressors: Pt arrived to the ED via EMS  d/t having a seizure from alcohol abuse. Pt has now been medically cleared for IP. Pt has been hallucinating, drinking half liter of hard liquor daily. Delusional thinking. AH VH . Pt is a poor historian     Pt affect in ED: Disorganized, Hallucinating   Pt Dx: Major Depressive Disorder, Generalized Anxiety Disorder, and Unspecified Psychosis  Previous IPMH hx? No  Pt endorses SI, no plan   Hx of suicide attempt? No  Pt denies SIB  Pt denies HI   Pt endorses auditory hallucinations  and endorses visual hallucination .   Pt RARS Score: unknown     Hx of aggression/violence, sexual offenses, legal concerns, Epic care plan? describe: NO   Current concerns for aggression this visit? No   Does pt have a history of Civil Commitment? No  Is Pt their own guardian? Yes    Pt is prescribed medication. Is patient medication compliant? No  Pt denies OP services   CD concerns: Actively using/consuming Alcohol , suboxone  but has not been taking   Acute or chronic medical concerns: No   Does Pt present with specific needs, assistive devices, or exclusionary criteria? None      Pt is ambulatory  Pt is able to perform ADLs independently      A: Pt to be reviewed for [facility] admission. Pt is on a 72HH, initiated @ 1/ 19/24  10:29 AM  Preferred placement: Statewide    COVID Symptoms: No  If yes, COVID test required   Utox: Positive for THC     CMP: Abnormalities: Urea Nitrogen 5.8, AST 50,   CBC: Abnormalities: RBC 3.23 , MCV  112 , 38.1,   HCG: N/A    R: Patient cleared and ready for behavioral bed placement: Yes  Pt placed on IP worklist? Yes    Does Patient need a Transfer Center request created? No, Pt is located within [facility] ED, [facility] ED, or [facility]'
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
## 2024-01-22 NOTE — CONSULTS
"      Psychiatry Consultation; Follow up              Reason for Consult, requesting source:    Follow up   Requesting source: Gwen Gonzales    Labs and imaging reviewed, seen by LIBBY Garza CNP      Total time spent in chart review, patient interview and coordination of care; 60 minutes - all time was spent on the date of the encounter that I saw patient             Interim history:    Lilibeth is a 28 year old female with history of substance use disorder who presented with alcoholic hallucinosis vs. Delirium tremens. Since treating her etoh withdrawal, patient has cleared and there is absolutely no sign of amy nor psychosis. Pt has relatively good insight into the events that transpired and she denies SI, HI, AVH. Alert and oriented x4.         Current Medications:      bacitracin   Topical BID    folic acid  1 mg Oral Daily    multivitamin w/minerals  1 tablet Oral Daily    thiamine  100 mg Oral Daily              MSE:   Appearance: awake, alert, adequately groomed, and dressed in hospital scrubs  Attitude:  cooperative  Eye Contact:  good  Mood:  good  Affect:  mood congruent  Speech:  clear, coherent  Psychomotor Behavior:  no evidence of tardive dyskinesia, dystonia, or tics  Muscle strength and tone: baseline   Thought Process:  logical, linear, and goal oriented  Associations:  no loose associations  Thought Content:  no evidence of suicidal ideation or homicidal ideation and no evidence of psychotic thought  Insight:  good  Judgement:  fair  Oriented to:  time, person, and place  Attention Span and Concentration:  intact  Recent and Remote Memory:  fair    Vital signs:  Temp: 98  F (36.7  C) Temp src: Oral BP: 125/82 Pulse: 77   Resp: 20 SpO2: 96 % O2 Device: None (Room air)        Estimated body mass index is 19.74 kg/m  as calculated from the following:    Height as of 1/18/24: 1.626 m (5' 4\").    Weight as of 1/18/24: 52.2 kg (115 lb).    Qtc: 453 on 1/18         DSM-5 Diagnosis:   Alcohol use " disorder, severe  Alcohol withdrawal with complication, resolved           Assessment:   Ms. Suzie Da Silva is a 28-year-old white female who presents to our emergency department with hallucinations and disorganization.  She has a previous history of substance use which was treated with buprenorphine and it appears that her narcotic use is in remission however she says she has been drinking heavily every day.     On repeat assessment, patient is alert and oriented x4. Has insight into consequences to her behavior. We had a jin discussion regarding how etoh affects our brains and mental health (psychosis, delirium, executive functioning, memory) and she verbalized understadning. Patient is no longer meeting criteria for inpatient psychiatry. Denying SI, SIB, AVH. Oriented, logical, and organized. No evidence of amy or psychosis. Pt is appropriately participating in safety planning and contracts for safety. They are appropriate for outpatient level of care.             Summary of Recommendations:     Psychiatrically safe for discharge. Discontinued 72HH and bedside sitter.   Made therapy appointment for patient         Judy Horne, GINA-BC  Consult/Liaison Psychiatry   [facility]

## 2024-01-22 NOTE — PLAN OF CARE
Goal Outcome Evaluation:    Alert and oriented X 4, stable and awaiting placement to inpatient psych. Writer met with patient, stated was tired and was going to sleep. Slept most of the night, up couple of times to use the restroom. Bedside attendant in room with patient for safety and 72 hours hold. No change over night. Continue POC.

## 2024-01-22 NOTE — DISCHARGE SUMMARY
[facility]  Hospitalist Discharge Summary      Date of Admission:  1/19/2024  Date of Discharge:  1/22/2024  Discharging Provider: TIFFANY TRIMBLE MD  Discharge Service: Hospitalist Service, GOLD TEAM 18    Discharge Diagnoses   See Below    Clinically Significant Risk Factors          Follow-ups Needed After Discharge   Follow-up Appointments     Adult [facility] Follow-up and recommended labs and tests      Referral sent for outpatient therapy    Appointments on [place] and/or [facility] (with [facility] or [facility]   provider or service). Call [phone] if you haven't heard regarding   these appointments within 7 days of discharge.                Discharge Disposition   Discharged to home  Condition at discharge: Good    Hospital Course   Lilibeth Da Silva is a 28 year old female with a history of substance use disorder on suboxone, anxiety, depression, and alcohol use disorder, who is presented and was admitted due to acute psychosis.      # Acute Psychosis   # Alcohol Use Disorder, Concern for withdrawal and intoxication  # Recent history of withdrawal seizure  # Hallucinations, bizarre behavior  -- Alcohol level elevated on admission 0.12 with recent history of seizure activity prior to arrival.   -- Per chart review, has been drinking heavily.   -- She has also been having visual and auditory hallucinations. Agitated, expansive mood.   Psychiatry consult, initially on 72 HH, but then this was discontinued by psychiatry.   - TSH, vitamin B12, folate levels ordered  Gabapentin and clonidine ordered and discontinued prior to discharge   Folic acid and thiamine ordered during admission      #Hypokalemia, resolved: Likely in the setting of alcohol use and poor nutrition. Replete per protocol.   #Elevated AST, improving: Could be elevated in the setting of injury to liver, cardiac muscle, skeletal muscle, kidney, and/or brain. Will continue to trend for now, as no other  s/s of end organ damage.  #Macrocytic Anemia: Likely in the setting of alcohol use with associated folic acid and/or vitamin B12 deficiency. Folic acid and thiamine tests ordered   #SHIRLEY: No longer on suboxone   #Mood Disorder: Previously on Fluoxetine 20 mg, but stopped taking. Will not resume.  # Left lateral wound   -Seen by the wound care team  BID: Wash with wound cleanser and gauze. Apply a generous amount of bacitracin to open areas. Cover with non-stick bandage.       Consultations This Hospital Stay   DIAGNOSTIC EVALUATION CENTER (DEC) ASSESSMENT ORDER  PSYCHIATRY IP CONSULT  WOUND OSTOMY CONTINENCE NURSE  IP CONSULT  PSYCHIATRY IP CONSULT  CHEMICAL DEPENDENCY IP CONSULT  PSYCHIATRY IP CONSULT    Code Status   Full Code    Time Spent on this Encounter   I, TIFFANY TRIMBLE MD, personally saw the patient today and spent greater than 30 minutes discharging this patient.       TIFFANY TRIMBLE MD  [facility] MED SUR[facility]  [address] [address]  Phone: [phone]  Fax: [phone]  ______________________________________________________________________    Physical Exam   Vital Signs: Temp: 98  F (36.7  C) Temp src: Oral BP: 125/82 Pulse: 77   Resp: 20 SpO2: 96 % O2 Device: None (Room air)    Weight: 0 lbs 0 oz    General Appearance: Sitting comfortably in bed, on room air, in no acute distress of discomfort  HEENT: PERRL: EOMI; moist mucous membrane w/o lesions  Neck: No JVD  Pulmonary: Clear to auscultation bilaterally, no wheezes or crackles  CVS: Regular rhythm, no murmurs, rubs or gallops  GI: BS (+), soft nontender, no rebound or guarding   Extremities: No LE edema.   Skin: No rashes or lesions  Neurologic: A&O x3       Primary Care Physician   Physician No Ref-Primary    Discharge Orders      Wound care    Twice daily wound care.  -- Wash with wound cleanser and gauze.   -- Apply a generous amount of bacitracin to open areas.   -- Cover with non-stick bandage.     Reason  for your hospital stay    Presented to the hospital due to acute psychosis likely in setting of alcohol intoxication.  She was managed for alcohol withdrawal.  Patient was initially placed on 72-hour hold per psychiatry but this was discontinued.  Patient did have left foot wound, was seen by the wound care team, patient to continue routine wound care at home following discharge.     Activity    Your activity upon discharge: activity as tolerated     Adult [facility] Follow-up and recommended labs and tests    Referral sent for outpatient therapy    Appointments on [place] and/or [facility] (with [facility] or [facility] provider or service). Call [phone] if you haven't heard regarding these appointments within 7 days of discharge.     Diet    Follow this diet upon discharge: Orders Placed This Encounter      Combination Diet Regular Diet Adult       Significant Results and Procedures   Most Recent 3 CBC's:  Recent Labs   Lab Test 01/20/24  0829 01/19/24  0833 01/18/24  0204   WBC 7.3 8.2 7.8   HGB 12.3 13.1 11.9   * 107* 105*    243 199     Most Recent 3 BMP's:  Recent Labs   Lab Test 01/22/24  0806 01/21/24  0836 01/20/24  0829 01/19/24  0833 01/18/24  0204   NA  --   --  144 138 134*   POTASSIUM 3.5 3.9 3.5  3.5 2.9* 3.4   CHLORIDE  --   --  107 97* 94*   CO2  --   --  28 26 24   BUN  --   --  5.8* 7.1 4.9*   CR  --   --  0.64 0.63 0.65   ANIONGAP  --   --  9 15 16*   PRASHANT  --   --  9.0 9.7 8.9   GLC  --   --  80 95 137*     Most Recent 2 LFT's:  Recent Labs   Lab Test 01/20/24  0829 01/19/24  0833   AST 50* 80*   ALT 18 17   ALKPHOS 48 52   BILITOTAL 0.6 0.7     Most Recent 3 INR's:No lab results found.,   Results for orders placed or performed during the hospital encounter of 01/18/24   CT Head w/o Contrast    Narrative    EXAM: CT HEAD W/O CONTRAST  LOCATION: [facility]  DATE: 1/18/2024    INDICATION: possible seizure, altered mental status  COMPARISON: None.  TECHNIQUE:  Routine CT Head without IV contrast. Multiplanar reformats. Dose reduction techniques were used.    FINDINGS:  INTRACRANIAL CONTENTS: No intracranial hemorrhage, extraaxial collection, or mass effect.  No CT evidence of acute infarct. Normal parenchymal attenuation. Normal ventricles and sulci.     VISUALIZED ORBITS/SINUSES/MASTOIDS: No intraorbital abnormality. No paranasal sinus mucosal disease. No middle ear or mastoid effusion.    BONES/SOFT TISSUES: No acute abnormality.      Impression    IMPRESSION:  1.  Normal head CT.       Discharge Medications   Current Discharge Medication List        START taking these medications    Details   bacitracin 500 UNIT/GM OINT Apply topically 2 times daily  Qty: 14.2 g, Refills: 0    Associated Diagnoses: Unspecified open wound, left foot, initial encounter           CONTINUE these medications which have NOT CHANGED    Details   FLUoxetine (PROZAC) 20 MG capsule Take 1 capsule (20 mg) by mouth daily  Qty: 90 capsule, Refills: 0    Associated Diagnoses: Depression with anxiety           Allergies   No Known Allergies

## 2024-01-22 NOTE — TELEPHONE ENCOUNTER
R:  1:05 PM Judy Horne, Psych called to update that Pt will be dsc home.  Intake will remove from [facility] worklist.

## 2024-01-22 NOTE — PROVIDER NOTIFICATION
Patient is alert and oriented x4.   Wound care instructions and supplies provided.   Questions encouraged and answered, patient verbalizes understanding.

## 2024-01-22 NOTE — PROGRESS NOTES
[facility]    Medicine Progress Note - Hospitalist Service, GOLD TEAM 18    Date of Admission:  1/19/2024    Assessment & Plan   Lilibeth Da Silva is a 28 year old female with a history of substance use disorder on suboxone, anxiety, depression, and alcohol use disorder, who is presented and was admitted due to acute psychosis.      # Acute Psychosis   # Alcohol Use Disorder, Concern for withdrawal and intoxication  # Recent history of withdrawal seizure  # Hallucinations, bizarre behavior  -- Alcohol level elevated on admission 0.12 with recent history of seizure activity prior to arrival.   -- Per chart review, has been drinking heavily.   -- She has also been having visual and auditory hallucinations. Agitated, expansive mood.   Psychiatry consult, appreciate recs; patient to remains on 72 HH  Since patient is scoring minimally on CIWA, will recommend transfer to inpatient psychiatry.  - TSH, vitamin B12, folate levels ordered  Gabapentin and clonidine ordered  Folic acid and thiamine ordered  Seizure precautions     #Hypokalemia, resolved: Likely in the setting of alcohol use and poor nutrition. Replete per protocol.   #Elevated AST, improving: Could be elevated in the setting of injury to liver, cardiac muscle, skeletal muscle, kidney, and/or brain. Will continue to trend for now, as no other s/s of end organ damage.  #Macrocytic Anemia: Likely in the setting of alcohol use with associated folic acid and/or vitamin B12 deficiency. Folic acid and thiamine tests ordered   #SHIRLEY: Continue suboxone TID  #Mood Disorder: Previously on Fluoxetine 20 mg, but stopped taking. Will not resume in the inpatient setting for now.   # Left lateral wound   -Seen by the wound care team  BID: Wash with wound cleanser and gauze. Apply a generous amount of bacitracin to open areas. Cover with non-stick bandage.                Diet: Combination Diet Regular Diet Adult    DVT Prophylaxis:  Pneumatic Compression Devices  Thapa Catheter: Not present  Lines: None     Cardiac Monitoring: None  Code Status: Full Code      Clinically Significant Risk Factors                                    Disposition Plan  On 72 HH. Patient completed detox. Ready for transfer to inpatient psych.      Expected Discharge Date: 01/22/2024                    TIFFANY TRIMBLE MD  Hospitalist Service, GOLD TEAM 18  [facility]  Securely message with [employer] (more info)  Text page via [employer] Paging/Directory   See signed in provider for up to date coverage information  ______________________________________________________________________    Interval History   - afebrile and HDS  -Pending transfer to inpatient psych.  - Psych consult has been placed.  -Scoring very minimally on CIWA.     Physical Exam   Vital Signs: Temp: 98  F (36.7  C) Temp src: Oral BP: 125/82 Pulse: 77   Resp: 20 SpO2: 96 % O2 Device: None (Room air)    Weight: 0 lbs 0 oz    General Appearance: Sitting comfortably in bed, on room air, in no acute distress of discomfort  HEENT: PERRL: EOMI; moist mucous membrane w/o lesions  Neck: No JVD  Pulmonary: Clear to auscultation bilaterally, no wheezes or crackles  CVS: Regular rhythm, no murmurs, rubs or gallops  GI: BS (+), soft nontender, no rebound or guarding   Extremities: No LE edema.   Skin: No rashes or lesions  Neurologic: A&O x3      Medical Decision Making       45 MINUTES SPENT BY ME on the date of service doing chart review, history, exam, documentation & further activities per the note.      Data   ------------------------- PAST 24 HR DATA REVIEWED -----------------------------------------------    I have personally reviewed the following data over the past 24 hrs:    N/A  \   N/A   / N/A     N/A N/A N/A /  N/A   3.9 N/A N/A \       Imaging results reviewed over the past 24 hrs:   No results found for this or any previous visit (from the past 24 hour(s)).

## 2024-01-22 NOTE — PROVIDER NOTIFICATION
IP MH Referral Acuity Rating Score (RARS)    LMHP complete at referral to IP MH, with DEC; and, daily while awaiting IP MH placement. Call score to PPS.  CRITERIA SCORING   New 72 HH and Involuntary for IP MH (not adolescent) 1/1   Boarding over 24 hours 1/1   Vulnerable adult at least 55+ with multiple co morbidities; or, Patient age 11 or under 0/1   Suicide ideation without relief of precipitating factors 0/1   Current plan for suicide 0/1   Current plan for homicide 0/1   Imminent risk or actual attempt to seriously harm another without relief of factors precipitating the attempt 0/1   Severe dysfunction in daily living (ex: complete neglect for self care, extreme disruption in vegetative function, extreme deterioration in social interactions) 1/1   Recent (last 2 weeks) or current physical aggression in the ED 0/1   Restraints or seclusion episode in ED 0/1   Verbal aggression, agitation, yelling, etc., while in the ED 0/1   Active psychosis with psychomotor agitation or catatonia 1/1   Need for constant or near constant redirection (from leaving, from others, etc).  0/1   Intrusive or disruptive behaviors 0/1   TOTAL Acuity Total Score: 4

## 2024-01-23 ENCOUNTER — PATIENT OUTREACH (OUTPATIENT)
Dept: CARE COORDINATION | Facility: CLINIC | Age: 29
End: 2024-01-23

## 2024-01-24 LAB
CANNABINOIDS UR CFM-MCNC: 95 NG/ML
CARBOXYTHC/CREAT UR: 194 NG/MG CREAT

## 2024-01-25 NOTE — PROGRESS NOTES
[facility]: [facility]: Post-Discharge Note  SITUATION                                                      Admission:    Admission Date: 01/19/24   Reason for Admission: presented with acute psychosis.  Discharge:   Discharge Date: 01/22/24  Discharge Diagnosis: Acute alcoholic hallucinosis (H)  Acute alcoholic intoxication in alcoholism with complication (H)  Substance use disorder   Alcohol-related disorder (H24)    BACKGROUND                                                      Per hospital discharge summary and inpatient provider notes:  Lilibeth Da Silva is a 28 year old female with a history of substance use disorder on suboxone, anxiety, depression, and alcohol use disorder, who is presenting today with auditory and visual hallucinations. Unfortunately, history was unable to be obtained from the patient, as she was unable to participate in history and physical examination. Per chart review, patient was seen on 1/18 after having seizure-like activity and burning her foot on a baseboard. She was discharged and then seen again today for visual/auditory hallucinations.     ASSESSMENT           Discharge Assessment  How are you doing now that you are home?: Today, the  (SW) spoke to Lilibeth regarding her well-being at home post-discharge. Lilibteh reported that she is doing really well, taking her medications, and would like to schedule an appointment with a provider for PRN anxiety medication as well as therapy.    The SW encouraged the patient to call the Behavioral Health Provider (BHP) number provided on her After Visit Summary (AVS). They can offer bridging to therapy while the patient works on obtaining insurance, as she is currently in the process of getting insurance.    The SW assisted the patient via a conference call in making a follow-up appointment with a provider, and the appointment has been successfully scheduled. The patient has no other questions or  concerns at this time  How are your symptoms? (Red Flag symptoms escalate to triage hotline per guidelines): Improved  Do you feel your condition is stable enough to be safe at home until your provider visit?: Yes  Does the patient have their discharge instructions? : Yes  Does the patient have questions regarding their discharge instructions? : No  Were you started on any new medications or were there changes to any of your previous medications? : Yes  Does the patient have all of their medications?: Yes  Do you have questions regarding any of your medications? : No  Do you have all of your needed medical supplies or equipment (DME)?  (i.e. oxygen tank, CPAP, cane, etc.): Yes  Discharge follow-up appointment scheduled within 14 calendar days? : Yes  Discharge Follow Up Appointment Date: 02/06/24  Discharge Follow Up Appointment Scheduled with?: Primary Care Provider      PLAN                                                      Outpatient Plan:  Follow-up Appointments     Adult [facility] Follow-up and recommended labs and tests      Referral sent for outpatient therapy     Appointments on [place] and/or [facility] (with [facility] or [facility]   provider or service). Call [phone] if you haven't heard regarding   these appointments within 7 days of discharge.      Future Appointments   Date Time Provider Department Center   2/6/2024  2:30 PM Hansa Garcia MD [facility]         For any urgent concerns, please contact our 24 hour nurse triage line: [phone] ([phone])         LEILA Pride (she/her/hers)  Social Work Clinic Care Coordinator   [facility]  [email or web address]  [phone]

## 2024-01-29 ENCOUNTER — NURSE TRIAGE (OUTPATIENT)
Dept: NURSING | Facility: CLINIC | Age: 29
End: 2024-01-29

## 2024-01-29 NOTE — TELEPHONE ENCOUNTER
Call from patient asking what she was prescribed when she was in the hospital recently on the 19th of Jan.    Informed she was on folic acid and thiamine.    Patient verbalized understanding.    Nancy Torrez RN, BSN  Triage Nurse Advisor          Reason for Disposition   Health Information question, no triage required and triager able to answer question    Protocols used: Information Only Call - No Triage-A-